# Patient Record
Sex: MALE | Race: BLACK OR AFRICAN AMERICAN | NOT HISPANIC OR LATINO | Employment: OTHER | URBAN - METROPOLITAN AREA
[De-identification: names, ages, dates, MRNs, and addresses within clinical notes are randomized per-mention and may not be internally consistent; named-entity substitution may affect disease eponyms.]

---

## 2022-12-05 ENCOUNTER — HOSPITAL ENCOUNTER (INPATIENT)
Facility: CLINIC | Age: 57
LOS: 9 days | Discharge: NURSING, REHAB OR LONG-TERM CARE | DRG: 884 | End: 2022-12-14
Attending: EMERGENCY MEDICINE | Admitting: HOSPITALIST
Payer: MEDICARE

## 2022-12-05 DIAGNOSIS — G93.40 ENCEPHALOPATHY: ICD-10-CM

## 2022-12-05 DIAGNOSIS — M62.82 NON-TRAUMATIC RHABDOMYOLYSIS: ICD-10-CM

## 2022-12-05 DIAGNOSIS — I10 BENIGN ESSENTIAL HYPERTENSION: Primary | ICD-10-CM

## 2022-12-05 DIAGNOSIS — T69.9XXA COLD EXPOSURE, INITIAL ENCOUNTER: ICD-10-CM

## 2022-12-05 LAB
ALBUMIN SERPL-MCNC: 3.9 G/DL (ref 3.4–5)
ALP SERPL-CCNC: 51 U/L (ref 40–150)
ALT SERPL W P-5'-P-CCNC: 28 U/L (ref 0–70)
ANION GAP SERPL CALCULATED.3IONS-SCNC: 11 MMOL/L (ref 3–14)
AST SERPL W P-5'-P-CCNC: 39 U/L (ref 0–45)
BASOPHILS # BLD AUTO: 0 10E3/UL (ref 0–0.2)
BASOPHILS NFR BLD AUTO: 0 %
BILIRUB SERPL-MCNC: 1.4 MG/DL (ref 0.2–1.3)
BUN SERPL-MCNC: 24 MG/DL (ref 7–30)
CALCIUM SERPL-MCNC: 9.6 MG/DL (ref 8.5–10.1)
CHLORIDE BLD-SCNC: 107 MMOL/L (ref 94–109)
CK SERPL-CCNC: 1215 U/L (ref 30–300)
CO2 SERPL-SCNC: 25 MMOL/L (ref 20–32)
CREAT SERPL-MCNC: 1.04 MG/DL (ref 0.66–1.25)
EOSINOPHIL # BLD AUTO: 0 10E3/UL (ref 0–0.7)
EOSINOPHIL NFR BLD AUTO: 0 %
ERYTHROCYTE [DISTWIDTH] IN BLOOD BY AUTOMATED COUNT: 12.4 % (ref 10–15)
GFR SERPL CREATININE-BSD FRML MDRD: 83 ML/MIN/1.73M2
GLUCOSE BLD-MCNC: 74 MG/DL (ref 70–99)
GLUCOSE BLDC GLUCOMTR-MCNC: 64 MG/DL (ref 70–99)
HCT VFR BLD AUTO: 43.4 % (ref 40–53)
HGB BLD-MCNC: 14.5 G/DL (ref 13.3–17.7)
IMM GRANULOCYTES # BLD: 0 10E3/UL
IMM GRANULOCYTES NFR BLD: 0 %
LYMPHOCYTES # BLD AUTO: 0.6 10E3/UL (ref 0.8–5.3)
LYMPHOCYTES NFR BLD AUTO: 5 %
MCH RBC QN AUTO: 29.1 PG (ref 26.5–33)
MCHC RBC AUTO-ENTMCNC: 33.4 G/DL (ref 31.5–36.5)
MCV RBC AUTO: 87 FL (ref 78–100)
MONOCYTES # BLD AUTO: 0.9 10E3/UL (ref 0–1.3)
MONOCYTES NFR BLD AUTO: 8 %
NEUTROPHILS # BLD AUTO: 10.4 10E3/UL (ref 1.6–8.3)
NEUTROPHILS NFR BLD AUTO: 87 %
NRBC # BLD AUTO: 0 10E3/UL
NRBC BLD AUTO-RTO: 0 /100
PLATELET # BLD AUTO: 166 10E3/UL (ref 150–450)
POTASSIUM BLD-SCNC: 4 MMOL/L (ref 3.4–5.3)
PROT SERPL-MCNC: 8.3 G/DL (ref 6.8–8.8)
RBC # BLD AUTO: 4.99 10E6/UL (ref 4.4–5.9)
SARS-COV-2 RNA RESP QL NAA+PROBE: NEGATIVE
SODIUM SERPL-SCNC: 143 MMOL/L (ref 133–144)
WBC # BLD AUTO: 11.9 10E3/UL (ref 4–11)

## 2022-12-05 PROCEDURE — 80053 COMPREHEN METABOLIC PANEL: CPT | Performed by: EMERGENCY MEDICINE

## 2022-12-05 PROCEDURE — 99292 CRITICAL CARE ADDL 30 MIN: CPT

## 2022-12-05 PROCEDURE — 258N000003 HC RX IP 258 OP 636: Performed by: EMERGENCY MEDICINE

## 2022-12-05 PROCEDURE — 36415 COLL VENOUS BLD VENIPUNCTURE: CPT | Performed by: EMERGENCY MEDICINE

## 2022-12-05 PROCEDURE — 250N000011 HC RX IP 250 OP 636: Performed by: EMERGENCY MEDICINE

## 2022-12-05 PROCEDURE — 120N000001 HC R&B MED SURG/OB

## 2022-12-05 PROCEDURE — 250N000011 HC RX IP 250 OP 636: Performed by: HOSPITALIST

## 2022-12-05 PROCEDURE — 96374 THER/PROPH/DIAG INJ IV PUSH: CPT

## 2022-12-05 PROCEDURE — 258N000003 HC RX IP 258 OP 636: Performed by: HOSPITALIST

## 2022-12-05 PROCEDURE — 96361 HYDRATE IV INFUSION ADD-ON: CPT

## 2022-12-05 PROCEDURE — C9803 HOPD COVID-19 SPEC COLLECT: HCPCS

## 2022-12-05 PROCEDURE — 99291 CRITICAL CARE FIRST HOUR: CPT | Mod: 25

## 2022-12-05 PROCEDURE — 85004 AUTOMATED DIFF WBC COUNT: CPT | Performed by: EMERGENCY MEDICINE

## 2022-12-05 PROCEDURE — 99223 1ST HOSP IP/OBS HIGH 75: CPT | Mod: AI | Performed by: HOSPITALIST

## 2022-12-05 PROCEDURE — U0005 INFEC AGEN DETEC AMPLI PROBE: HCPCS | Performed by: EMERGENCY MEDICINE

## 2022-12-05 PROCEDURE — 82550 ASSAY OF CK (CPK): CPT | Performed by: EMERGENCY MEDICINE

## 2022-12-05 RX ORDER — LIDOCAINE 40 MG/G
CREAM TOPICAL
Status: DISCONTINUED | OUTPATIENT
Start: 2022-12-05 | End: 2022-12-14 | Stop reason: HOSPADM

## 2022-12-05 RX ORDER — LORAZEPAM 2 MG/ML
0.5 INJECTION INTRAMUSCULAR EVERY 6 HOURS PRN
Status: DISCONTINUED | OUTPATIENT
Start: 2022-12-05 | End: 2022-12-14 | Stop reason: HOSPADM

## 2022-12-05 RX ORDER — LORAZEPAM 2 MG/ML
0.5 INJECTION INTRAMUSCULAR EVERY 10 MIN PRN
Status: DISCONTINUED | OUTPATIENT
Start: 2022-12-05 | End: 2022-12-05

## 2022-12-05 RX ORDER — HALOPERIDOL 5 MG/1
5 TABLET ORAL 2 TIMES DAILY
COMMUNITY
Start: 2022-11-23

## 2022-12-05 RX ORDER — LANOLIN ALCOHOL/MO/W.PET/CERES
3 CREAM (GRAM) TOPICAL AT BEDTIME
COMMUNITY
Start: 2022-11-23

## 2022-12-05 RX ORDER — SODIUM CHLORIDE 9 MG/ML
INJECTION, SOLUTION INTRAVENOUS CONTINUOUS
Status: DISCONTINUED | OUTPATIENT
Start: 2022-12-05 | End: 2022-12-06 | Stop reason: ALTCHOICE

## 2022-12-05 RX ORDER — POLYETHYLENE GLYCOL 3350 17 G/17G
17 POWDER, FOR SOLUTION ORAL DAILY
COMMUNITY
Start: 2022-11-23

## 2022-12-05 RX ORDER — HALOPERIDOL 2 MG/1
2 TABLET ORAL EVERY 6 HOURS PRN
COMMUNITY
Start: 2022-11-23

## 2022-12-05 RX ORDER — NICOTINE POLACRILEX 4 MG
15-30 LOZENGE BUCCAL
Status: DISCONTINUED | OUTPATIENT
Start: 2022-12-05 | End: 2022-12-14 | Stop reason: HOSPADM

## 2022-12-05 RX ORDER — DEXTROSE MONOHYDRATE 25 G/50ML
25-50 INJECTION, SOLUTION INTRAVENOUS
Status: DISCONTINUED | OUTPATIENT
Start: 2022-12-05 | End: 2022-12-14 | Stop reason: HOSPADM

## 2022-12-05 RX ADMIN — SODIUM CHLORIDE 1000 ML: 9 INJECTION, SOLUTION INTRAVENOUS at 10:16

## 2022-12-05 RX ADMIN — LORAZEPAM 0.5 MG: 2 INJECTION INTRAMUSCULAR; INTRAVENOUS at 12:11

## 2022-12-05 RX ADMIN — LORAZEPAM 0.5 MG: 2 INJECTION INTRAMUSCULAR; INTRAVENOUS at 20:19

## 2022-12-05 RX ADMIN — SODIUM CHLORIDE: 9 INJECTION, SOLUTION INTRAVENOUS at 20:20

## 2022-12-05 RX ADMIN — SODIUM CHLORIDE 1000 ML: 9 INJECTION, SOLUTION INTRAVENOUS at 11:32

## 2022-12-05 ASSESSMENT — ACTIVITIES OF DAILY LIVING (ADL)
ADLS_ACUITY_SCORE: 35

## 2022-12-05 NOTE — ED TRIAGE NOTES
Pt comes via EMS. Pt is from group home, last seen at 4:20 pm yesterday (12/4). Pt wondered off and this AM EMS found him miles away from group home [place]. Pt is cold, confused to situation, somnolent at times, at other times anxious, rambling saying strange and often paranoid comments. Blood sugar with EMS 95. All other vitals stable. Temp on admission 97.4f oral.      Triage Assessment     Row Name 12/05/22 1007       Triage Assessment (Adult)    Airway WDL WDL       Respiratory WDL    Respiratory WDL WDL       Skin Circulation/Temperature WDL    Skin Circulation/Temperature WDL X;temperature    Skin Temperature cool       Cardiac WDL    Cardiac WDL WDL       Peripheral/Neurovascular WDL    Peripheral Neurovascular WDL neurovascular assessment upper;neurovascular assessment lower;pulse assessment    Pulse Assessment dorsalis pedis       LUE Neurovascular Assessment    Temperature LUE cold       RUE Neurovascular Assessment    Temperature RUE cold       LLE Neurovascular Assessment    Temperature LLE cold       RLE Neurovascular Assessment    Temperature RLE cold       Pulse Dorsalis Pedis    Left Dorsalis Pedis Pulse 2+ (normal)    Right Dorsalis Pedis Pulse 2+ (normal)       Cognitive/Neuro/Behavioral WDL    Cognitive/Neuro/Behavioral WDL X    Level of Consciousness somnolent    Arousal Level arouses to touch/gentle shaking    Orientation disoriented to;place;situation    Mood/Behavior anxious       Vaughn Coma Scale    Best Eye Response 3-->(E3) to speech    Best Motor Response 4-->(M4) withdraws from pain    Best Verbal Response 4-->(V4) confused    Vaughn Coma Scale Score 11

## 2022-12-05 NOTE — ED NOTES
[facility]  ED Nurse Handoff Report    ED Chief complaint: Cold Exposure and Altered Mental Status      ED Diagnosis:   Final diagnoses:   Non-traumatic rhabdomyolysis   Cold exposure, initial encounter   Encephalopathy       Code Status: not addressed at this time    Allergies: No Known Allergies    Patient Story: Pt comes via EMS. Pt is from group home, last seen at 4:20 pm yesterday (12/4). Pt wondered off and this AM EMS found him miles away from group home in a [place] area. Pt is cold, confused to situation, somnolent at times, at other times anxious, rambling saying strange and often paranoid comments. Blood sugar with EMS 95. All other vitals stable. Temp on admission 97.4f oral.  Focused Assessment:  Pt remains confused and sleepy at times. VSS on RA. Around 1200 pt was standing in room peeing, striking out at staff when trying to assist back to bed, see previous note. Pt was placed in 5 point restraints, Iv ativan also given. Pt confused and saying paranoid comments. Long psych history. 2L NS given. CK elevated, see results below.     Abnormal Labs Resulted from Time of ED Arrival to Time of ED Departure   COMPREHENSIVE METABOLIC PANEL - Abnormal       Result Value    Sodium 143      Potassium 4.0      Chloride 107      Carbon Dioxide (CO2) 25      Anion Gap 11      Urea Nitrogen 24      Creatinine 1.04      Calcium 9.6      Glucose 74      Alkaline Phosphatase 51      AST 39      ALT 28      Protein Total 8.3      Albumin 3.9      Bilirubin Total 1.4 (*)     GFR Estimate 83     CK TOTAL - Abnormal    CK 1,215 (*)    CBC WITH PLATELETS AND DIFFERENTIAL - Abnormal    WBC Count 11.9 (*)     RBC Count 4.99      Hemoglobin 14.5      Hematocrit 43.4      MCV 87      MCH 29.1      MCHC 33.4      RDW 12.4      Platelet Count 166      % Neutrophils 87      % Lymphocytes 5      % Monocytes 8      % Eosinophils 0      % Basophils 0      % Immature Granulocytes 0      NRBCs per 100 WBC 0      Absolute  "Neutrophils 10.4 (*)     Absolute Lymphocytes 0.6 (*)     Absolute Monocytes 0.9      Absolute Eosinophils 0.0      Absolute Basophils 0.0      Absolute Immature Granulocytes 0.0      Absolute NRBCs 0.0         To be done/followed up on inpatient unit:  cont with admitting MD orders    Does this patient have any cognitive concerns?: Baseline dementia, Disoriented to time, Disoriented to place and Disoriented to situation    Activity level - Baseline/Home:  Independent  Activity Level - Current:   Stand with Assist    Patient's Preferred language: English   Needed?: No    Isolation: None  Infection: Not Applicable  Patient tested for COVID 19 prior to admission: YES  Bariatric?: No    Vital Signs:   Vitals:    12/05/22 1009 12/05/22 1015 12/05/22 1300   BP: (!) 143/87 105/85    Pulse: 99 99    Resp: 14     Temp: 97.5  F (36.4  C)     TempSrc: Oral     SpO2: 95% 95%    Weight:   86.3 kg (190 lb 4.1 oz)   Height:   1.905 m (6' 3\")       Cardiac Rhythm:     Was the PSS-3 completed:   Yes  What interventions are required if any?               Family Comments: group home attempted to call, VM left  OBS brochure/video discussed/provided to patient/family: No                For the majority of the shift this patient's behavior was yellow/red.   Behavioral interventions performed were Security presence, restraints, continuous video monitoring     ED NURSE PHONE NUMBER: [phone]       "

## 2022-12-05 NOTE — H&P
"[facility]    History and Physical  Hospitalist       Date of Admission:  12/5/2022    Assessment & Plan   Evans Zimmer is a 57 year old male resident of halfway, presented with altered mental status, cold exposure per EMS.    History major neurocognitive disorder, no history elicited from patient  Behavioral issue, wandering from Group Home  Cold exposure x 24 hours; mild rhabdo; admission CK 1215. .  No history from patient, primarily nonverbal, per EMS lives in group home, last seen yesterday afternoon then wandered off from group home.  Found in [facility] by EMS, cold exposure overnight.  At this time no history from group home.  Review of Care Everywhere indicates last hospitalization [facility] in August 2022 [8/-/11/25/22] essentially presenting the same, nonsensical, loose association, seen by Psychiatry with diagnosis of \"major neurocognitive disorder\" notation of antisocial behavior, history of opiate use disorder, previously on methadone maintenance, admission UDS negative; current medication awaiting input from group [place].   On that hospitalization there was notation of possible Padilla/commitment however does not appear this was done and patient discharged to group home.  In ED patient reported he was cold, no signs of cold exposure, core temperature now normal after warming blankets however CK elevated at 1215; normal CR  .In ED reports of acute aggressive behavior.  In discussing with nurse it appears that he needed to urinate and could not be consoled, became physically aggressive.  ED initiated four-point physical restraints and IV Ativan.  -Sitter 1: 1, monitor behaviors.  Continue hold status per ED  -Psychiatry consult  -At this time mentation appears at baseline and patient could not comply to head CT without significant sedation; monitor  -IVF, BMP and CK in AM  -PRN IV Ativan for acute aggressive behavior.  Need to assess patient's ability to comply to " "p.o.PRN medications, i.e. Zyprexa or Seroquel.  Need to obtain from Group Home PTA maintenance medications.  Defer to Psychiatry if need for any scheduled antipsychotic medications.  As acute agitation ED was secondary to need to urinate, scheduled toileting as it appears patient cannot communicate wants/needs.  -Attempt reassurance/redirection.  -SW to assist with disposition pending psychiatry evaluation.    History hepatitis C, historical  -Noted    -History hearing impairment, unable to assess, however may be contributory to behavioral issues.    DVT Prophylaxis: Ambulate every shift  Code Status: Full Code; unable to discuss with patient, need determination from Group Home, in the interim maintain full CODE STATUS.    Sreekanth Foote MD    Primary Care Physician   Physician No Ref-Primary    Chief Complaint   Per EMS found wandering, cold exposure.    History is obtained from EMS and ED provider    History of Present Illness   Evans Zimmer is a 57 year old male resident of [facility], presented with altered mental status, cold exposure per EMS.   No history from patient, primarily nonverbal, per EMS lives in group home, last seen yesterday afternoon then wandered off from group home.  Found in [facility] by EMS, cold exposure overnight.  At this time no history from group home.  Review of Care Everywhere indicates last hospitalization [facility] in August 2022 [8/-/11/25/22] essentially presenting the same, nonsensical, loose association, seen by Psychiatry with diagnosis of \"major neurocognitive disorder\" notation of antisocial behavior, history of opiate use disorder, previously on methadone maintenance, admission UDS negative; current medication awaiting input from group [place].   On that hospitalization there was notation of possible Padilla/commitment however does not appear this was done and patient discharged to group home.  In ED patient reported he was cold, no signs of cold exposure, core " temperature now normal after warming blankets however CK elevated at 1215; normal CR  .In ED reports of acute aggressive behavior.  In discussing with nurse it appears that he needed to urinate and could not be consoled, became physically aggressive.  ED initiated four-point physical restraints and IV Ativan. Remainder of HPI as above.        Past Medical History    I have reviewed this patient's medical history and updated it with pertinent information if needed.   Major neurocognitive disorder  History of opioid use disorder previously on methadone maintenance    Hepatitis C.    Past Surgical History   I have reviewed this patient's surgical history and updated it with pertinent information if needed.  Inguinal hernia repair    Allergies   No Known Allergies    Social History   I have reviewed this patient's social history and updated it with pertinent information if needed  Lives in Group Home    Family History   I have reviewed this patient's family history and updated it with pertinent information if needed.   Hx not available from patient; review of records indicates no pertinent FH    Review of Systems   The 10 point Review of Systems is negative other than noted in the HP    Physical Exam   Temp: 97.5  F (36.4  C) Temp src: Oral BP: 105/85 Pulse: 99   Resp: 14 SpO2: 95 % O2 Device: None (Room air)    Vital Signs with Ranges  Temp:  [97.5  F (36.4  C)] 97.5  F (36.4  C)  Pulse:  [99] 99  Resp:  [14] 14  BP: (105-143)/(85-87) 105/85  SpO2:  [95 %] 95 %  0 lbs 0 oz    General/Constitutional:  No acute distress, nonverbal, in four-point restraints, calm, cooperative, appears comfortable.  HEENT/Head Exam:  atraumatic  Eyes:  PERRL, no conjunctivits  Mouth/Oral Pharynx:  Buccal mucosa WNL  Chest/Respiratory: Respiration nonlabored on RA.  Cardiovascular:  no murmur appreciated.  LE edema none  Gastrointestinal/Abdomen:  soft, nontender,no rebound, guarding or other peritoneal signs.  Musculoskeletal:  extremities  warm, dry, noncyanotic  Neurologic:  gross CN and motor testing nonfocal.    Psychiatric:  Mental status: Awake; orientation could be tested. affect calm       Data     Recent Labs   Lab 12/05/22  1015   WBC 11.9*   HGB 14.5   MCV 87         POTASSIUM 4.0   CHLORIDE 107   CO2 25   BUN 24   CR 1.04   ANIONGAP 11   ALCIRA 9.6   GLC 74   ALBUMIN 3.9   PROTTOTAL 8.3   BILITOTAL 1.4*   ALKPHOS 51   ALT 28   AST 39

## 2022-12-05 NOTE — PHARMACY-ADMISSION MEDICATION HISTORY
Pharmacy Medication History  Admission medication history interview status for the 12/5/2022  admission is complete. See EPIC admission navigator for prior to admission medications     Location of Interview: Outside patient room but on unit  Medication history sources: Surescripts, [place] and Care Everywhere    Significant changes made to the medication list:  Added all medications from recent [facility] stay      Time spent in this activity: 45 minutes    Prior to Admission medications    Medication Sig Last Dose Taking? Auth Provider Long Term End Date   cholecalciferol 25 MCG (1000 UT) TABS Take 2,000 Units by mouth daily Unknown Yes Unknown, Entered By History     haloperidol (HALDOL) 2 MG tablet Take 2 mg by mouth every 6 hours as needed for agitation Unknown Yes Unknown, Entered By History Yes    haloperidol (HALDOL) 5 MG tablet Take 5 mg by mouth 2 times daily Unknown Yes Unknown, Entered By History Yes    melatonin 3 MG tablet Take 3 mg by mouth At Bedtime Unknown Yes Unknown, Entered By History     polyethylene glycol (MIRALAX) 17 GM/Dose powder Take 17 g by mouth daily Unknown Yes Unknown, Entered By History         The information provided in this note is only as accurate as the sources available at the time of update(s)
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
## 2022-12-06 LAB
ANION GAP SERPL CALCULATED.3IONS-SCNC: 2 MMOL/L (ref 3–14)
BUN SERPL-MCNC: 18 MG/DL (ref 7–30)
CALCIUM SERPL-MCNC: 8.4 MG/DL (ref 8.5–10.1)
CHLORIDE BLD-SCNC: 114 MMOL/L (ref 94–109)
CK SERPL-CCNC: 806 U/L (ref 30–300)
CO2 SERPL-SCNC: 25 MMOL/L (ref 20–32)
CREAT SERPL-MCNC: 0.81 MG/DL (ref 0.66–1.25)
GFR SERPL CREATININE-BSD FRML MDRD: >90 ML/MIN/1.73M2
GLUCOSE BLD-MCNC: 76 MG/DL (ref 70–99)
GLUCOSE BLDC GLUCOMTR-MCNC: 123 MG/DL (ref 70–99)
GLUCOSE BLDC GLUCOMTR-MCNC: 67 MG/DL (ref 70–99)
GLUCOSE BLDC GLUCOMTR-MCNC: 76 MG/DL (ref 70–99)
POTASSIUM BLD-SCNC: 4 MMOL/L (ref 3.4–5.3)
SODIUM SERPL-SCNC: 141 MMOL/L (ref 133–144)

## 2022-12-06 PROCEDURE — 258N000003 HC RX IP 258 OP 636: Performed by: HOSPITALIST

## 2022-12-06 PROCEDURE — 250N000011 HC RX IP 250 OP 636: Performed by: HOSPITALIST

## 2022-12-06 PROCEDURE — 36415 COLL VENOUS BLD VENIPUNCTURE: CPT | Performed by: HOSPITALIST

## 2022-12-06 PROCEDURE — 99233 SBSQ HOSP IP/OBS HIGH 50: CPT | Performed by: HOSPITALIST

## 2022-12-06 PROCEDURE — 80048 BASIC METABOLIC PNL TOTAL CA: CPT | Performed by: HOSPITALIST

## 2022-12-06 PROCEDURE — 82550 ASSAY OF CK (CPK): CPT | Performed by: HOSPITALIST

## 2022-12-06 PROCEDURE — 120N000001 HC R&B MED SURG/OB

## 2022-12-06 PROCEDURE — 258N000001 HC RX 258: Performed by: INTERNAL MEDICINE

## 2022-12-06 RX ADMIN — LORAZEPAM 0.5 MG: 2 INJECTION INTRAMUSCULAR; INTRAVENOUS at 03:17

## 2022-12-06 RX ADMIN — DEXTROSE MONOHYDRATE 50 ML: 25 INJECTION, SOLUTION INTRAVENOUS at 08:16

## 2022-12-06 RX ADMIN — DEXTROSE AND SODIUM CHLORIDE: 5; 900 INJECTION, SOLUTION INTRAVENOUS at 08:35

## 2022-12-06 RX ADMIN — DEXTROSE MONOHYDRATE 25 ML: 25 INJECTION, SOLUTION INTRAVENOUS at 00:02

## 2022-12-06 ASSESSMENT — ACTIVITIES OF DAILY LIVING (ADL)
ADLS_ACUITY_SCORE: 35

## 2022-12-06 NOTE — ED NOTES
Patient remain asleep after removal of his restraints.   IVF infusing well.   Arousable with voice and gentle touch. Did not follow commands, did not respond to questions. Unable to complete Communicable Disease Screening and PSS-3 scale.   Maintained 1:1 sitter.
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
## 2022-12-06 NOTE — ED NOTES
Right ankle, Right wrist, Left ankle, Left wrist, and Lap belt restraints continued 12/5/2022    Called DR Holguin to request for restraint renewal. Will continue attempts to wean from restraints.     CK level 1215. Called Dr Holguin for update. Plan to draw labs in AM.         Attending Physician Notified: Yes, Attending Physician's Name: Dr Holguin   New orders placed Yes         Marizol Swartz RN

## 2022-12-06 NOTE — ED NOTES
[facility] Note:    Writer assisted in getting a laptop set up for patient's recommitment hearing that was scheduled for this morning at 10:30. ED 1:1 was present during recommitment hearing and writer does not know further details. It was explained that at this time, the extended care team is NOT following patient and once he is admitted medically, consult and liason can be consulted for ongoing needs. It is necessary to note that the following are needed for ongoing coordination and commitment/revocation needs:    -: Vika Ramirez, [phone], [email or web address]    - [facility] Attorney: Petros Joseph, [phone], [email or web address].      Alma Moeller on 12/6/2022 at 12:01 PM

## 2022-12-06 NOTE — ED NOTES
Patient was out of bed, wandering around the room. Urinated and had a watery BM. Pt removed his IV and the PrimoWick. Difficult to re-direct, took 3 nurses and an ED tech to help him to his bed and get him cleaned. 24g IV restarted an IV ativan given.

## 2022-12-06 NOTE — ED NOTES
Patient ambulated to the bathroom with assist of 2. Patient got to the bathroom and states he wants to go back to his bed. Patient walked back to his room.

## 2022-12-06 NOTE — PROGRESS NOTES
RECEIVING UNIT ED HANDOFF REVIEW    ED Nurse Handoff Report was reviewed by: Aram Yates RN on December 6, 2022 at 4:56 PM

## 2022-12-07 LAB
ANION GAP SERPL CALCULATED.3IONS-SCNC: 5 MMOL/L (ref 3–14)
BUN SERPL-MCNC: 13 MG/DL (ref 7–30)
CALCIUM SERPL-MCNC: 8.2 MG/DL (ref 8.5–10.1)
CHLORIDE BLD-SCNC: 113 MMOL/L (ref 94–109)
CK SERPL-CCNC: 1057 U/L (ref 30–300)
CO2 SERPL-SCNC: 23 MMOL/L (ref 20–32)
CREAT SERPL-MCNC: 0.81 MG/DL (ref 0.66–1.25)
ERYTHROCYTE [DISTWIDTH] IN BLOOD BY AUTOMATED COUNT: 12.3 % (ref 10–15)
GFR SERPL CREATININE-BSD FRML MDRD: >90 ML/MIN/1.73M2
GLUCOSE BLD-MCNC: 92 MG/DL (ref 70–99)
HCT VFR BLD AUTO: 42.3 % (ref 40–53)
HGB BLD-MCNC: 13.8 G/DL (ref 13.3–17.7)
MCH RBC QN AUTO: 29.1 PG (ref 26.5–33)
MCHC RBC AUTO-ENTMCNC: 32.6 G/DL (ref 31.5–36.5)
MCV RBC AUTO: 89 FL (ref 78–100)
PLATELET # BLD AUTO: 149 10E3/UL (ref 150–450)
POTASSIUM BLD-SCNC: 3.5 MMOL/L (ref 3.4–5.3)
RBC # BLD AUTO: 4.75 10E6/UL (ref 4.4–5.9)
SODIUM SERPL-SCNC: 141 MMOL/L (ref 133–144)
WBC # BLD AUTO: 5.1 10E3/UL (ref 4–11)

## 2022-12-07 PROCEDURE — 999N000040 HC STATISTIC CONSULT NO CHARGE VASC ACCESS

## 2022-12-07 PROCEDURE — 85014 HEMATOCRIT: CPT | Performed by: HOSPITALIST

## 2022-12-07 PROCEDURE — 250N000013 HC RX MED GY IP 250 OP 250 PS 637: Performed by: HOSPITALIST

## 2022-12-07 PROCEDURE — 82550 ASSAY OF CK (CPK): CPT | Performed by: HOSPITALIST

## 2022-12-07 PROCEDURE — 36415 COLL VENOUS BLD VENIPUNCTURE: CPT | Performed by: HOSPITALIST

## 2022-12-07 PROCEDURE — 80048 BASIC METABOLIC PNL TOTAL CA: CPT | Performed by: HOSPITALIST

## 2022-12-07 PROCEDURE — 250N000011 HC RX IP 250 OP 636: Performed by: HOSPITALIST

## 2022-12-07 PROCEDURE — 999N000128 HC STATISTIC PERIPHERAL IV START W/O US GUIDANCE

## 2022-12-07 PROCEDURE — 999N000127 HC STATISTIC PERIPHERAL IV START W US GUIDANCE

## 2022-12-07 PROCEDURE — 120N000001 HC R&B MED SURG/OB

## 2022-12-07 PROCEDURE — 99233 SBSQ HOSP IP/OBS HIGH 50: CPT | Performed by: HOSPITALIST

## 2022-12-07 RX ORDER — SODIUM CHLORIDE AND POTASSIUM CHLORIDE 150; 900 MG/100ML; MG/100ML
INJECTION, SOLUTION INTRAVENOUS CONTINUOUS
Status: DISCONTINUED | OUTPATIENT
Start: 2022-12-07 | End: 2022-12-08

## 2022-12-07 RX ORDER — HALOPERIDOL 5 MG/1
5 TABLET ORAL 2 TIMES DAILY
Status: DISCONTINUED | OUTPATIENT
Start: 2022-12-07 | End: 2022-12-14 | Stop reason: HOSPADM

## 2022-12-07 RX ORDER — SODIUM CHLORIDE AND POTASSIUM CHLORIDE 150; 900 MG/100ML; MG/100ML
INJECTION, SOLUTION INTRAVENOUS CONTINUOUS
Status: DISCONTINUED | OUTPATIENT
Start: 2022-12-07 | End: 2022-12-07

## 2022-12-07 RX ORDER — OLANZAPINE 5 MG/1
5 TABLET, ORALLY DISINTEGRATING ORAL EVERY 6 HOURS PRN
Status: DISCONTINUED | OUTPATIENT
Start: 2022-12-07 | End: 2022-12-14 | Stop reason: HOSPADM

## 2022-12-07 RX ORDER — POLYETHYLENE GLYCOL 3350 17 G/17G
17 POWDER, FOR SOLUTION ORAL DAILY
Status: DISCONTINUED | OUTPATIENT
Start: 2022-12-07 | End: 2022-12-07 | Stop reason: DRUGHIGH

## 2022-12-07 RX ORDER — POLYETHYLENE GLYCOL 3350 17 G/17G
17 POWDER, FOR SOLUTION ORAL DAILY
Status: DISCONTINUED | OUTPATIENT
Start: 2022-12-07 | End: 2022-12-14 | Stop reason: HOSPADM

## 2022-12-07 RX ORDER — VITAMIN B COMPLEX
2000 TABLET ORAL DAILY
Status: DISCONTINUED | OUTPATIENT
Start: 2022-12-07 | End: 2022-12-14 | Stop reason: HOSPADM

## 2022-12-07 RX ADMIN — HALOPERIDOL 5 MG: 5 TABLET ORAL at 20:58

## 2022-12-07 RX ADMIN — POTASSIUM CHLORIDE AND SODIUM CHLORIDE: 900; 150 INJECTION, SOLUTION INTRAVENOUS at 11:16

## 2022-12-07 RX ADMIN — Medication 2000 UNITS: at 11:14

## 2022-12-07 RX ADMIN — HALOPERIDOL 5 MG: 5 TABLET ORAL at 11:18

## 2022-12-07 RX ADMIN — POLYETHYLENE GLYCOL 3350 17 G: 17 POWDER, FOR SOLUTION ORAL at 11:21

## 2022-12-07 ASSESSMENT — ACTIVITIES OF DAILY LIVING (ADL)
ADLS_ACUITY_SCORE: 41
ADLS_ACUITY_SCORE: 35
ADLS_ACUITY_SCORE: 41
ADLS_ACUITY_SCORE: 35
ADLS_ACUITY_SCORE: 41
ADLS_ACUITY_SCORE: 41

## 2022-12-07 NOTE — PROGRESS NOTES
"[facility]    Hospitalist Progress Note      Assessment & Plan   Evans Zimmer is a 57 year old male resident of assisted, presented with altered mental status, cold exposure per EMS.    History major neurocognitive disorder, no history elicited from patient  Behavioral issue, wandering from Group Home  Cold exposure x 24 hours; mild rhabdo; admission CK 1215. .  No history from patient, primarily nonverbal, per EMS lives in group home, last seen yesterday day before admission then wandered off from group home.  Found in [facility] by EMS, cold exposure overnight.  At this time no history from group home.  Review of Care Everywhere indicates last hospitalization [facility] in August 2022 [8/-/11/25/22] essentially presenting the same, nonsensical, loose association, seen by Psychiatry with diagnosis of \"major neurocognitive disorder\" notation of antisocial behavior, history of opiate use disorder, previously on methadone maintenance, admission UDS negative; current medication awaiting input from group home.   On that hospitalization there was notation of possible [place]/commitment however does not appear this was done and patient discharged to group home.  In ED patient reported he was cold, no signs of cold exposure, core temperature normal after warming blankets however CK elevated at 1215; normal CR  .In ED reports of acute aggressive behavior.  In discussing with nurse it appears that he needed to urinate and could not be consoled, became physically aggressive.  ED initiated four-point physical restraints and IV Ativan.  -Sitter 1: 1, monitor behaviors.  Continue hold status per ED  -Psychiatry consult  -At this time mentation appears at baseline and patient could not comply to head CT without significant sedation; monitor  -IVF, BMP and CK in AM; WNL. No able to eat; discontinue IVF  -PRN IV Ativan for acute aggressive behavior.  Need to assess patient's ability to comply to " "p.o.PRN medications, i.e. Zyprexa or Seroquel.  Need to obtain from Group Home PTA maintenance medications.  Defer to Psychiatry if need for any scheduled antipsychotic medications.  As acute agitation ED was secondary to need to urinate, scheduled toileting as it appears patient cannot communicate wants/needs.  Ambulation/walks around unit with gait belt and staff as patient desires and staff available to prevent agitation.  -Attempt reassurance/redirection.  -12/7/2022 mental health notes states \"commitment has been renewed and awaiting court revocation order of his provisional discharge to be signed by a  that would change his 72-hour hold status to court ordered status.'  -Received PTA maintenance medications from group home that includes Haldol 5 mg twice daily, we will initiate and monitor.  Continued scheduled toileting as patient cannot states wants and needs as well as ambulation with staff and gait belt as staff is available to prevent agitation   -RRT called earlier this morning as he attempted to leave his room on his own.  Again his behavior is interpreted as aggressive however he cannot express his wants and needs.  Scheduled toileting and ambulate with Sitter as available as patient cannot express his wants/needs to decrease episodes of agitation..  -CK increased to 1057, IVF continued yesterday however appears that patient does not take adequate p.o.'s, restart IVF NS with 20 mEq KCl at 100 cc an hour, recheck CK and BMP in AM.  Encourage fluids.      History hepatitis C, historical  -Noted     -History hearing impairment, unable to assess, Patient unable to respond to questioning;  however may be contributory to behavioral issues.    DVT Prophylaxis: Ambulate every shift  Code Status: Full Code    Expected discharge:  >2 days pending clinical improvement rhabdomyolysis, adequate p.o. intake without IV support, safe disposition, status of commitment hearings.    Sreekanth Foote MD  Text Page " (7am - 6pm, M-F)    Total unit/floor time 35 minutes:  time consisted of the following, examination of patient, review of records including labs, imaging results, medications, interdisciplinary notes and completing documentation; > 50%  Coordination of Care time with Nursing, Sitter and SW re: behaviors, rhabdomyolysis, fluid intake care plan, management and surveillance.      Interval History   Limited history from patient however appears more awake, more easily answers with 1 or 2 words appropriately.  There is negative to pain.  Somewhat impulsive and that he gets up and wants to walk.  RRT called earlier this morning as he attempted to leave his room on his own.  Again his behavior is interpreted as aggressive however he cannot express his wants and needs.      SH: No tobacco. Lives in     ROS: Complete ROS negative except as above.     -Data reviewed today: I reviewed all new labs and imaging results over the last 24 hours.  Physical Exam   Temp: 98  F (36.7  C) Temp src: Oral BP: (!) 141/84 Pulse: 70   Resp: 17 SpO2: 96 % O2 Device: None (Room air)    Vitals:    12/05/22 1300   Weight: 86.3 kg (190 lb 4.1 oz)     General/Constitutional:  NAD, more awake, calm, more cooperative.  Long arm sitter.         HEENT/Head Exam:  atraumatic  Eyes:  PERRL, no conjunctivits  Mouth/Oral Pharynx:  Buccal mucosa WNL  Chest/Respiratory: Respiration nonlabored on RA.  Cardiovascular:  no murmur appreciated.  LE edema none  Gastrointestinal/Abdomen:  soft, nontender,no rebound, guarding or other peritoneal signs.  Musculoskeletal:  extremities warm, dry, noncyanotic  Neurologic:  gross motor testing nonfocal.    Psychiatric:  Mental status: Awake; orientation could be tested. affect calm     Medications     0.9% sodium chloride + KCl 20 mEq/L 100 mL/hr at 12/07/22 1116       haloperidol  5 mg Oral BID     polyethylene glycol  17 g Oral Daily     sodium chloride (PF)  3 mL Intracatheter Q8H     Vitamin D3  2,000 Units Oral  Daily       Data   Recent Labs   Lab 12/07/22  0852 12/06/22  0851 12/06/22  0812 12/06/22  0721 12/05/22  2330 12/05/22  1015   WBC 5.1  --   --   --   --  11.9*   HGB 13.8  --   --   --   --  14.5   MCV 89  --   --   --   --  87   *  --   --   --   --  166     --   --  141  --  143   POTASSIUM 3.5  --   --  4.0  --  4.0   CHLORIDE 113*  --   --  114*  --  107   CO2 23  --   --  25  --  25   BUN 13  --   --  18  --  24   CR 0.81  --   --  0.81  --  1.04   ANIONGAP 5  --   --  2*  --  11   ALCIRA 8.2*  --   --  8.4*  --  9.6   GLC 92 123* 67* 76   < > 74   ALBUMIN  --   --   --   --   --  3.9   PROTTOTAL  --   --   --   --   --  8.3   BILITOTAL  --   --   --   --   --  1.4*   ALKPHOS  --   --   --   --   --  51   ALT  --   --   --   --   --  28   AST  --   --   --   --   --  39    < > = values in this interval not displayed.

## 2022-12-07 NOTE — CONSULTS
"      Initial Psychiatric Consult   Consult date: December 7, 2022         Reason for Consult, requesting source:    Neurocognitive disorder, wanders  Requesting source: Sreekanth Foote    This note is being entered to supplement the psychiatry consultation note that was completed on December 7, 2022 by the licensed mental health professional Symone Zimmerman. They have reviewed with me the pertinent clinical details related to their encounter. I am being consulted to offer additional guidance on psychiatric pharmacological interventions.         HPI:   Evans Zimmer is a 57 year old male with a history of dementia, opiate use disorder (previously on methadone maintenance), resident of [facility], who presented on 12/5 with altered mental status, cold exposure per EMS who found him in a swampy area after wandering away from group home. Patient himself is primarily nonverbal, or provides nonsensical answers at times, so he is not a reliable historian. He was previously admitted to [facility] in August 2022 at which time he was placed on commitment; per unit SW this was just renewed so is still in effect.     I met with Evans in his room where he is seen sitting in recliner, wearing hospital scrubs. He presents with flat affect. At times does not respond to questions but in generally he does respond, though responses do not always make sense. His speech is very soft and difficult to hear at times. He gestures to his abdomen as if he is complaining of some kind of pain or discomfort, but then denies any problems with his abdomen. He states he is sleeping \"fine\". States he has no problems with eating. He does not have any complaints at this time. He is unable to respond to orientation questions. Abruptly stands up, walks over to the window, then gestures with his finger over his mouth to be quiet while he looks out the window in apparent paranoia though he is unable to verbalize what he is experiencing. He is denies " "problems with his mood, anxiety, denies SI/HI/AVH.    Spoke with nursing, who reports that his behaviors have been manageable and he has been redirectable. Does state that earlier today he used a fork to poke his IV tubing, requiring IV to be removed and have been trying to get a new IV placed. Group home is willing to accept him back once medically stable, plans to use a \"wander guard\" to alert staff if he wanders away again.            Physical ROS:   The 10 point Review of Systems is negative other than noted in the HPI or here.           Medications:       haloperidol  5 mg Oral BID     polyethylene glycol  17 g Oral Daily     sodium chloride (PF)  3 mL Intracatheter Q8H     Vitamin D3  2,000 Units Oral Daily            Physical and Psychiatric Examination:     BP (!) 141/84 (BP Location: Right arm)   Pulse 70   Temp 98  F (36.7  C) (Oral)   Resp 17   Ht 1.905 m (6' 3\")   Wt 86.3 kg (190 lb 4.1 oz)   SpO2 96%   BMI 23.78 kg/m    Weight is 190 lbs 4.11 oz  Body mass index is 23.78 kg/m .    Physical Exam:  I have reviewed the physical exam as documented by by the medical team and agree with findings and assessment and have no additional findings to add at this time.    Mental Status Exam:  Appearance: awake, alert and wearing hospital scrubs  Attitude:  somewhat cooperative  Eye Contact:  poor   Mood:  \"I'm good\"  Affect:  flat  Speech:  very soft  Psychomotor Behavior:  no evidence of tardive dyskinesia, dystonia, or tics  Throught Process:  disorganized, evidence of thought blocking present and illogical  Associations:  loosening of associations present  Thought Content:  no evidence of suicidal ideation or homicidal ideation and appears paranoid but unable to verbalize  Insight:  partial  Judgement:  poor  Oriented to:  unable to respond to questions, seems oriented to self only  Attention Span and Concentration:  limited  Recent and Remote Memory:  limited               DSM-5 Diagnosis:   Dementia " "(major neurocognitive disorder), by history  R/O delirium  History of opiate use disorder, in remission          Assessment:   Evans Zimmer is a 57 year old male with a history of dementia who presented on 12/5 after wandering away from his group home, was found in a swampy area per EMS. PTA medications include haloperidol 5mg BID which has been continued here. Does not appear to be overly sedated from this medication, does still appear to have some paranoia but does not seem overt. Recommend continuing home haloperidol at PTA dosing. Will order olanzapine ODT 5mg PRN for agitation.          Summary of Recommendations:   1.  Continue haloperidol 5mg BID    2.  Ordered olanzapine 5mg every 6 hours as needed for agitation/aggression    3.  Recommend continued follow up with outpatient psych provider following discharge    4.  Please reconsult psychiatry as needed.       \"This dictation was performed with voice recognition software and may contain errors,  omissions and inadvertent word substitution.\"           "

## 2022-12-07 NOTE — PLAN OF CARE
Goal Outcome Evaluation:  Summary: AMS, Wanders, Cold exposure, [place], From Group home. 72hr Hold till 12/7 2235.  DATE & TIME:  12/6/22 3504-4978   Cognitive Concerns/ Orientation : LIDIA, Pt does not answer any questions.   BEHAVIOR & AGGRESSION TOOL COLOR: Green, Flat affect.  CIWA SCORE: NA  ABNL VS/O2: VSS @RA  MOBILITY: Stand by.  PAIN MANAGMENT: LIDIA  DIET: Reg  BOWEL/BLADDER: Continent  ABNL LAB/BG: NA. PRN BG checks.   DRAIN/DEVICES: PIV SL  TELEMETRY RHYTHM: NA  SKIN: LIDIA, Pt refused to take off his clothing.  TESTS/PROCEDURES: NA  D/C DAY/GOALS/PLACE: NA  OTHER IMPORTANT INFO: Pt is 1:1. Ativan for agitation. Pt inconsistent with responding to answer. Withdrawn and flat affect. Unable to complete admission questions. Pt calm and quiet this shift. Needs frequent redirection. PRN BG.

## 2022-12-07 NOTE — PLAN OF CARE
Goal Outcome Evaluation:      Plan of Care Reviewed With: patient    Overall Patient Progress: no change    DATE & TIME:  12/6/22 3180-5374  Cognitive Concerns/ Orientation : LIDIA, pt inconsistent with answering questions. Quiet. Alert. Flat affect  BEHAVIOR & AGGRESSION TOOL COLOR: Green  ABNL VS/O2: VSS on RA   MOBILITY: SBA  PAIN MANAGMENT: Denies, absence of nonverbal pain indicators   DIET: Regular  BOWEL/BLADDER: Continent  ABNL LAB/BG: , WBC 11.9  DRAIN/DEVICES: Pt removed PIV  SKIN: LIDIA, refused  TESTS/PROCEDURES: NA  D/C DAY/GOALS/PLACE: Pending psych eval. Pt is from a group home.  OTHER IMPORTANT INFO: Unable to complete admission questions. 72 hour hold. Psych consulted.

## 2022-12-07 NOTE — PLAN OF CARE
Goal Outcome Evaluation:                    Summary: AMS, Wanders, Cold exposure, [place], From Group home. 72hr Hold till 12/7 2235.  DATE & TIME:  12/7/22 1700  Cognitive Concerns/ Orientation : LIDIA, pt inconsistent with answering questions. Quiet, talks in a whisper. Alert. Flat affect  BEHAVIOR & AGGRESSION TOOL COLOR: Green  ABNL VS/O2: VSS on RA   MOBILITY: SBA  PAIN MANAGMENT: Denies, absence of nonverbal pain indicators   DIET: Regular  BOWEL/BLADDER: Continent  ABNL LAB/BG: CK 1057.  DRAIN/DEVICES: Pt removed PIV, 2nd IV was placed and pt took fork at lunch and poked it through the tubing.  SKIN: LIDIA, refused  TESTS/PROCEDURES: NA  D/C DAY/GOALS/PLACE: Pending psych eval. Pt is from a group home.  OTHER IMPORTANT INFO: Unable to complete admission questions. 72 hour hold. Psych consulted. Pt has attempted to leave a couple times today but was re-directable to return to room. Pt was started back on haldol as well.

## 2022-12-07 NOTE — CONSULTS
Triage and Transition - Consult and Liaison     Evans Zimmer  December 7, 2022    Psychiatry consult acknowledged.     Writer received communication from  that Mr. Zelaya's commitment has been renewed and she is waiting for the court revocation order of his provisional discharge to be signed by a  this will change his 72 hour hold status to a court hold status.     SYLVESTER DIXON MSW, [facility]  Triage and Transition - Consult and Liaison   [phone]
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
## 2022-12-07 NOTE — PROGRESS NOTES
Care Management Initial Consult    General Information  PATIENT IS UNDER A MI AND [address] ORDERED COMMITMENT THRU 12/14/2003.  Assessment completed with: Other, Pradip Bunn, manager of [facility]     Type of CM/SW Visit: Initial Assessment    Primary Care Provider verified and updated as needed:     Readmission within the last 30 days: no previous admission in last 30 days         Advance Care Planning:            Communication Assessment  Patient's communication style: spoken language (English or Bilingual)    Per manager of Pradip Randolph, patient speaks very little.       Cognitive  Cognitive/Neuro/Behavioral: .WDL except  Level of Consciousness: alert  Arousal Level: opens eyes spontaneously  Orientation: other (see comments) (LIDIA)  Mood/Behavior: withdrawn, calm          Living Environment:   People in home: other (see comments) (5 other male residents)     Current living Arrangements:        Able to return to prior arrangements: yes       Family/Social Support:  Care provided by: self, other (see comments) (staff give direction)  Provides care for:                  Description of Support System:  Marcos Zimmer  [phone]    Current Resources:   Patient receiving home care services:       Community Resources:  (patient has a [facility] )  Equipment currently used at home:    Supplies currently used at home:      Employment/Financial:  Employment Status:          Financial Concerns:             Lifestyle & Psychosocial Needs:  Social Determinants of Health     Tobacco Use: Not on file   Alcohol Use: Not on file   Financial Resource Strain: Not on file   Food Insecurity: Not on file   Transportation Needs: Not on file   Physical Activity: Not on file   Stress: Not on file   Social Connections: Not on file   Intimate Partner Violence: Not on file   Depression: Not on file   Housing Stability: Not on file       Functional Status:  Prior to admission patient needed assistance:               Mental Health Status:  Mental Health Status: Current Concern  Mental Health Management:  (patient is under a MI Commitment thru [facility].  Commitment first issue Cherie 15,2022 and extended yesterday on .  The new court order will  2023.  Patient is his own guardian.    Chemical Dependency Status:                Values/Beliefs:  Spiritual, Cultural Beliefs, Rastafarian Practices, Values that affect care:                 Additional Information:  Per Pradip Bunn, manager of [facility],patient moved in on .  He requires supervision for personal cares.  He needs to be shown where the bathroom is.     Mr Bunn said they arranged for a wander guard which arrived yesterday so when patient returns he will be wearing a wander guard bracelet so staff would be aware if patient attempted to leave as a alarm would go off.   Per Court Document, the original petition for MI and Padilla was presented by [facility]. (22 to 22). Discharged to a group home and displayed aggressive behavior, multiple elopments.  He returned to [facility] for treatment of ongoing aggressive behavior.     Writer asked Mr Bunn if patient has had any outbursts since admission and Pradip said patient has not.   Since patient has admitted they have tried to engage him in activities but patient has stayed to himself.   Pradip reports he is  able to accept patient back and will have the wander guard for patient.   Bedside RN shares she received a call from patient's daughter who does not want patient to return to the [facility] due to patient wandering away.    It is unknown if she is aware that [facility] will have the wander guard system in place.      Symone Zimmerman, [facility] mental health provider, in her note from today, indicates she spoke with patient's Behavioral CM who will be  revoking patient's provisional discharge to [facility] and ask the  to sign a court order for patient to be held here until a  discharge plan is in place. Given this information, patient will try to reach this CM and ascertain her plan for patient.    Plan: Will follow. Plan to speak with his Commitment CM and daughter.  GARLAND Davila

## 2022-12-08 LAB
ANION GAP SERPL CALCULATED.3IONS-SCNC: 5 MMOL/L (ref 3–14)
BUN SERPL-MCNC: 10 MG/DL (ref 7–30)
CALCIUM SERPL-MCNC: 8.2 MG/DL (ref 8.5–10.1)
CHLORIDE BLD-SCNC: 113 MMOL/L (ref 94–109)
CK SERPL-CCNC: 630 U/L (ref 30–300)
CO2 SERPL-SCNC: 24 MMOL/L (ref 20–32)
CREAT SERPL-MCNC: 0.79 MG/DL (ref 0.66–1.25)
GFR SERPL CREATININE-BSD FRML MDRD: >90 ML/MIN/1.73M2
GLUCOSE BLD-MCNC: 123 MG/DL (ref 70–99)
POTASSIUM BLD-SCNC: 3.9 MMOL/L (ref 3.4–5.3)
SODIUM SERPL-SCNC: 142 MMOL/L (ref 133–144)

## 2022-12-08 PROCEDURE — 99233 SBSQ HOSP IP/OBS HIGH 50: CPT | Performed by: HOSPITALIST

## 2022-12-08 PROCEDURE — 120N000001 HC R&B MED SURG/OB

## 2022-12-08 PROCEDURE — 36415 COLL VENOUS BLD VENIPUNCTURE: CPT | Performed by: HOSPITALIST

## 2022-12-08 PROCEDURE — 250N000013 HC RX MED GY IP 250 OP 250 PS 637: Performed by: HOSPITALIST

## 2022-12-08 PROCEDURE — 82550 ASSAY OF CK (CPK): CPT | Performed by: HOSPITALIST

## 2022-12-08 PROCEDURE — 82310 ASSAY OF CALCIUM: CPT | Performed by: HOSPITALIST

## 2022-12-08 RX ADMIN — HALOPERIDOL 5 MG: 5 TABLET ORAL at 08:00

## 2022-12-08 RX ADMIN — POLYETHYLENE GLYCOL 3350 17 G: 17 POWDER, FOR SOLUTION ORAL at 08:00

## 2022-12-08 RX ADMIN — Medication 2000 UNITS: at 08:00

## 2022-12-08 RX ADMIN — HALOPERIDOL 5 MG: 5 TABLET ORAL at 22:21

## 2022-12-08 ASSESSMENT — ACTIVITIES OF DAILY LIVING (ADL)
ADLS_ACUITY_SCORE: 43
ADLS_ACUITY_SCORE: 41
ADLS_ACUITY_SCORE: 43
ADLS_ACUITY_SCORE: 41

## 2022-12-08 NOTE — PLAN OF CARE
"Goal Outcome Evaluation:      Plan of Care Reviewed With: patient    Overall Patient Progress: no change    DATE & TIME:  12/7/22 2228-8779  Cognitive Concerns/ Orientation : LIDIA, pt inconsistent with answering questions. Quiet, talks in a whisper. Alert. Flat affect. Cooperative and redirectable   BEHAVIOR & AGGRESSION TOOL COLOR: Green  ABNL VS/O2: VSS on RA   MOBILITY: SBA  PAIN MANAGMENT: Denies, absence of nonverbal pain indicators   DIET: Regular  BOWEL/BLADDER: Continent  ABNL LAB/BG: CK 1057.  DRAIN/DEVICES: L PIV infusing NS+20KCl @100mL/hr, needs reminders to not peel IV dressing  SKIN: LIDIA, refused  TESTS/PROCEDURES: NA  D/C DAY/GOALS/PLACE: Pending psych eval. Pt is from group home. Per SW, \"patient's Behavioral CM will be revoking patient's provisional discharge to [facility] and ask the  to sign a court order for patient to be held here until a discharge plan is in place\"  OTHER IMPORTANT INFO: Unable to complete admission questions. 72 hour hold. Psych consulted.   "

## 2022-12-08 NOTE — PLAN OF CARE
"Goal Outcome Evaluation:                      Summary: AMS, Wanders, Cold exposure, [place], From Group home. 72hr Hold on court order status.  DATE & TIME:  12/8/22 1600  Cognitive Concerns/ Orientation : LIDIA, pt inconsistent with answering questions. Quiet, talks in a whisper. Alert. Flat affect. Cooperative and redirectable   BEHAVIOR & AGGRESSION TOOL COLOR: Green  ABNL VS/O2: VSS on RA   MOBILITY: SBA  PAIN MANAGMENT: Denies, absence of nonverbal pain indicators   DIET: Regular, eating better today.  BOWEL/BLADDER: Continent  ABNL LAB/BG: .  DRAIN/DEVICES: IVF stopped, so far pt has not pulled out IV SL.  SKIN: LIDIA, refused  TESTS/PROCEDURES: NA  D/C DAY/GOALS/PLACE: Pending psych eval. Pt is from group home. Per SW, \"patient's Behavioral CM will be revoking patient's provisional discharge to [facility] and ask the  to sign a court order for patient to be held here until a discharge plan is in place\"  OTHER IMPORTANT INFO: Unable to complete admission questions. 72 hour hold. Psych consulted.   "

## 2022-12-08 NOTE — PROGRESS NOTES
Care Management Follow Up    Length of Stay (days): 3    Expected Discharge Date: 12/09/2022     Concerns to be Addressed:       Patient plan of care discussed at interdisciplinary rounds: Yes    Anticipated Discharge Disposition:  To be secure memory care unit.     Anticipated Discharge Services:    Anticipated Discharge DME:      Patient/family educated on Medicare website which has current facility and service quality ratings:    Education Provided on the Discharge Plan:    Patient/Family in Agreement with the Plan:      Referrals Placed by CM/SW:    Private pay costs discussed: Not applicable    Additional Information:  Patient's Targeted  under his MI Commitment is Vikaalida Ramirez [phone].   Today, writer did receive the Court Order indicating patient's Provisional Discharge to [facility] has been revoke and he is to be held here until a new plan is in place.  Document is in patient's paper chart under the legal index.   Ms Ramirez is not comfortable with patient returning to the [facility] as the house is not secure. Patient does have a frequent history of eloping from his previous group home in addition to the elopement at [facility]. She explains patient generally tries to leave his environment when his delusions tell him his daughter, who lives in [place], is in danger.    She is concerned with the cold temperatures and another elopement could be life threatening for patient.  She is making a referral to a memory care facility which is secure.    Ms Ramirez speaks frequently with patient's daughter who lives in [place].  She will speak with her today.  She has encouraged daughter to petition for guardianship. This conversation continues.     Plan:  Will speak with Ms Ramirez tomorrow for placement update.     WALE DavilaSW       no

## 2022-12-08 NOTE — PROGRESS NOTES
"[facility]    Hospitalist Progress Note      Assessment & Plan   Evans Zimmer is a 57 year old male resident of senior living, presented with altered mental status, cold exposure per EMS.    History major neurocognitive disorder, no history elicited from patient  Behavioral issue, wandering from Group Home  Cold exposure x 24 hours; mild rhabdo; admission CK 1215. .  No history from patient, primarily nonverbal, per EMS lives in group home, last seen yesterday day before admission then wandered off from group home.  Found in [facility] by EMS, cold exposure overnight.  At this time no history from group home.  Review of Care Everywhere indicates last hospitalization [facility] in August 2022 [8/-/11/25/22] essentially presenting the same, nonsensical, loose association, seen by Psychiatry with diagnosis of \"major neurocognitive disorder\" notation of antisocial behavior, history of opiate use disorder, previously on methadone maintenance, admission UDS negative; current medication awaiting input from group home.   On that hospitalization there was notation of possible Padilla/commitment however does not appear this was done and patient discharged to group home.  In ED patient reported he was cold, no signs of cold exposure, core temperature normal after warming blankets however CK elevated at 1215; normal CR  .In ED reports of acute aggressive behavior.  In discussing with nurse it appears that he needed to urinate and could not be consoled, became physically aggressive.  ED initiated four-point physical restraints and IV Ativan.  -Sitter 1: 1, monitor behaviors.  Continue hold status per ED  -Psychiatry consult  -At this time mentation appears at baseline and patient could not comply to head CT without significant sedation; monitor  -IVF, BMP and CK in AM; WNL. No able to eat; discontinue IVF  -PRN IV Ativan for acute aggressive behavior.  Need to assess patient's ability to comply to " "p.o.PRN medications, i.e. Zyprexa or Seroquel.  Need to obtain from Group Home PTA maintenance medications.  Defer to Psychiatry if need for any scheduled antipsychotic medications.  As acute agitation ED was secondary to need to urinate, scheduled toileting as it appears patient cannot communicate wants/needs.  Ambulation/walks around unit with gait belt and staff as patient desires and staff available to prevent agitation.  -Attempt reassurance/redirection.  -12/7/2022 mental health notes states \"commitment has been renewed and awaiting court revocation order of his provisional discharge to be signed by a  that would change his 72-hour hold status to court ordered status.'  -Received PTA maintenance medications from group home that includes Haldol 5 mg twice daily, we will initiate and monitor.  Continued scheduled toileting as patient cannot states wants and needs as well as ambulation with staff and gait belt as staff is available to prevent agitation   -RRT called earlier 12/7/2022 as he attempted to leave his room on his own.  Again his behavior is interpreted as aggressive however he cannot express his wants and needs.  Scheduled toileting and ambulate with Sitter as available as patient cannot express his wants/needs to decrease episodes of agitation..  -CK increased to 1057, IVF resumed 12/7/2022 however appears that patient does not take adequate p.o.'s, restart IVF NS with 20 mEq KCl at 100 cc an hour, recheck CK and BMP in AM.  Encourage fluids.  -12/8/2022 CK decreased to 630 with IVF.  On discussion with patient and sitter it appears that he is taking in adequate p.o.'s and on encounter was able to voice need for toileting and wanting ambulation.  No excess sedation noted on restart of PTA schedule Haldol twice daily.  DC IVF, encourage p.o. fluids, recheck CK in AM.  Regarding disposition, SW note reviewed, it appears that daughter does not want patient to return to prior group home due to found " outside of home, cold exposure.  Therefore it appears that provisional discharge to prior group home is revoked and  to signed court ordered commitment withheld until discharge plan in place.    History hepatitis C, historical  -Noted     -History hearing impairment, unable to assess, Patient unable to respond to questioning;  however may be contributory to behavioral issues.  -Patient responsive to questioning without obvious hearing impairment at conversational level.  Follow-up auditory acuity per PCP.    DVT Prophylaxis: Ambulate every shift  Code Status: Full Code    Expected discharge:  >2 days pending clinical improvement rhabdomyolysis, adequate p.o. intake without IV support, safe disposition and  signed committment.    Sreekanth Foote MD  Text Page (7am - 6pm, M-F)    Total unit/floor time 35 minutes:  time consisted of the following, examination of patient, review of records including labs, imaging results, medications, interdisciplinary notes and completing documentation; > 50% Coordination of Care time with Nursing and Sitter regarding CK/rhabdomyolysis, behaviors care plan, management and surveillance; discharge planning.      Interval History   Patient appears more awake, engaged in conversation, responses appropriate with 1-2 word answers.  Was able to voice needs and wants now including toileting, desire for ambulation.  Sitter to assist.  Patient and sitter notes patient taking in p.o.'s, no acute behavioral issues, review of records indicates no need for PRN sedation.  Restarted on PTA schedule Haldol twice daily, no excess sedation noted.  Monitor.    SH: No tobacco. Lives in , as above daughter does not want patient to return to PTA Group home.    ROS: Complete ROS negative except as above.     -Data reviewed today: I reviewed all new labs and imaging results over the last 24 hours.  Physical Exam   Temp: 97.4  F (36.3  C) Temp src: Axillary BP: 115/74 Pulse: 69   Resp: 17 SpO2:  99 % O2 Device: None (Room air)    Vitals:    12/05/22 1300   Weight: 86.3 kg (190 lb 4.1 oz)     General/Constitutional:  NAD, more awake, calm, cooperative         HEENT/Head Exam:  atraumatic  Eyes:  PERRL, no conjunctivits  Mouth/Oral Pharynx:  Buccal mucosa WNL  Chest/Respiratory: Respiration nonlabored on RA.  Cardiovascular:  no murmur appreciated.  LE edema none  Gastrointestinal/Abdomen:  soft, nontender,no rebound, guarding or other peritoneal signs.  Musculoskeletal:  extremities warm, dry, noncyanotic  Neurologic:  gross motor testing nonfocal.    Observed ambulation on unit, gait belt, standby assist.  Psychiatric:  Mental status: Awake; orientation not tested. affect calm     Medications       haloperidol  5 mg Oral BID     polyethylene glycol  17 g Oral Daily     sodium chloride (PF)  3 mL Intracatheter Q8H     Vitamin D3  2,000 Units Oral Daily       Data   Recent Labs   Lab 12/08/22  0810 12/07/22  0852 12/06/22  0851 12/06/22  0812 12/06/22  0721 12/05/22  2330 12/05/22  1015   WBC  --  5.1  --   --   --   --  11.9*   HGB  --  13.8  --   --   --   --  14.5   MCV  --  89  --   --   --   --  87   PLT  --  149*  --   --   --   --  166    141  --   --  141  --  143   POTASSIUM 3.9 3.5  --   --  4.0  --  4.0   CHLORIDE 113* 113*  --   --  114*  --  107   CO2 24 23  --   --  25  --  25   BUN 10 13  --   --  18  --  24   CR 0.79 0.81  --   --  0.81  --  1.04   ANIONGAP 5 5  --   --  2*  --  11   ALCIRA 8.2* 8.2*  --   --  8.4*  --  9.6   * 92 123*   < > 76   < > 74   ALBUMIN  --   --   --   --   --   --  3.9   PROTTOTAL  --   --   --   --   --   --  8.3   BILITOTAL  --   --   --   --   --   --  1.4*   ALKPHOS  --   --   --   --   --   --  51   ALT  --   --   --   --   --   --  28   AST  --   --   --   --   --   --  39    < > = values in this interval not displayed.

## 2022-12-09 PROCEDURE — 99231 SBSQ HOSP IP/OBS SF/LOW 25: CPT | Performed by: HOSPITALIST

## 2022-12-09 PROCEDURE — 250N000013 HC RX MED GY IP 250 OP 250 PS 637: Performed by: HOSPITALIST

## 2022-12-09 PROCEDURE — 120N000001 HC R&B MED SURG/OB

## 2022-12-09 RX ADMIN — HALOPERIDOL 5 MG: 5 TABLET ORAL at 21:13

## 2022-12-09 RX ADMIN — HALOPERIDOL 5 MG: 5 TABLET ORAL at 09:14

## 2022-12-09 RX ADMIN — Medication 2000 UNITS: at 09:14

## 2022-12-09 ASSESSMENT — ACTIVITIES OF DAILY LIVING (ADL)
ADLS_ACUITY_SCORE: 41

## 2022-12-09 NOTE — PLAN OF CARE
"Goal Outcome Evaluation:      Plan of Care Reviewed With: patient    Overall Patient Progress: no change    DATE & TIME: 22 4849-9384  Cognitive Concerns/ Orientation : LIDIA, pt inconsistent with answering questions. Quiet, talks in a whisper. Alert. Flat affect. Cooperative and redirectable   BEHAVIOR & AGGRESSION TOOL COLOR: Green  ABNL VS/O2: VSS on RA   MOBILITY: SBA. Ambulated in halls  PAIN MANAGMENT: Denies, absence of nonverbal pain indicators   DIET: Regular   BOWEL/BLADDER: Continent  ABNL LAB/BG: .  DRAIN/DEVICES: None  SKIN: LIDIA, refused  TESTS/PROCEDURES: NA  D/C DAY/GOALS/PLACE: [place] has been revoked. Pt will be held here until locked memory care can be found. SW and MI Commitment  following for placement.   OTHER IMPORTANT INFO: Unable to complete admission questions. Psych following. 72 hour hold . Per MD, \" to signed court ordered commitment withheld until discharge plan in place.\"  "

## 2022-12-09 NOTE — PLAN OF CARE
"Goal Outcome Evaluation:     Summary: AMS, Wanders, Cold exposure, [place], From Group [place].   DATE & TIME: 22 6509-4512  Cognitive Concerns/ Orientation : LIDIA, pt inconsistent with answering questions. Quiet, talks in a whisper. Alert. Flat affect. Cooperative and redirectable.  Walking halls most of shift   BEHAVIOR & AGGRESSION TOOL COLOR: Green  ABNL VS/O2: VSS on RA   MOBILITY: SBA.   PAIN MANAGMENT: Denies, absence of nonverbal pain indicators   DIET: Regular   BOWEL/BLADDER: Continent  ABNL LAB/BG: , redraw ordered but after one failed attempt, pt refused a second.  DRAIN/DEVICES: None  SKIN: LIDIA, refused  TESTS/PROCEDURES: CK recheck ordered- pt refused another needle stick after first blood draw unsuccessful   D/C DAY/GOALS/PLACE: Provisional discharge to [facility] has been revoked. Pt will be held here until locked memory care can be found. SW and MI Commitment  following for placement.   OTHER IMPORTANT INFO: Unable to complete admission questions. Psych following. 72 hour hold . Per MD, \" to signed court ordered commitment withheld until discharge plan in place.\"                  "

## 2022-12-09 NOTE — PROGRESS NOTES
Care Management Follow Up    Length of Stay (days): 4    Expected Discharge Date: 12/10/2022     Concerns to be Addressed:       Patient plan of care discussed at interdisciplinary rounds: Yes    Anticipated Discharge Disposition:  Admission to a secured care facility     Anticipated Discharge Services:    Anticipated Discharge DME:      Patient/family educated on Medicare website which has current facility and service quality ratings:    Education Provided on the Discharge Plan:    Patient/Family in Agreement with the Plan:      Referrals Placed by CM/SW:    Private pay costs discussed:     Additional Information:  Placed a call to Patient's Targeted  under his MI Commitment  Vika Ramirez [phone].  Left her a message asking if she has made any progress in finding patient a secured care center.    Patient remains under a court hold and will be here till a safe discharge plan is in place. Ms Ramirez communicates with daughter on a regular basis.       WALE DavilaSW

## 2022-12-09 NOTE — PROGRESS NOTES
"[facility]    Hospitalist Progress Note      Assessment & Plan   Evans Zimmer is a 57 year old male resident of assisted, presented with altered mental status, cold exposure per EMS.    History major neurocognitive disorder, no history elicited from patient  Behavioral issue, wandering from Group Home  Cold exposure x 24 hours; mild rhabdo; admission CK 1215. .  No history from patient, primarily nonverbal, per EMS lives in group home, last seen yesterday day before admission then wandered off from group home.  Found in [facility] by EMS, cold exposure overnight.  At this time no history from group home.  Review of Care Everywhere indicates last hospitalization [facility] [place] in August 2022 [8/-/11/25/22] essentially presenting the same, nonsensical, loose association, seen by Psychiatry with diagnosis of \"major neurocognitive disorder\" notation of antisocial behavior, history of opiate use disorder, previously on methadone maintenance, admission UDS negative; current medication awaiting input from group home.   On that hospitalization there was notation of possible Padilla/commitment however does not appear this was done and patient discharged to group home.  In ED patient reported he was cold, no signs of cold exposure, core temperature normal after warming blankets however CK elevated at 1215; normal CR  .In ED reports of acute aggressive behavior.  In discussing with nurse it appears that he needed to urinate and could not be consoled, became physically aggressive.  ED initiated four-point physical restraints and IV Ativan.  -Sitter 1: 1, monitor behaviors.  Continue hold status per ED  -Psychiatry consult  -At this time mentation appears at baseline and patient could not comply to head CT without significant sedation; monitor  -IVF, BMP and CK in AM; WNL. No able to eat; discontinue IVF  -PRN IV Ativan for acute aggressive behavior.  Need to assess patient's ability to comply to " "p.o.PRN medications, i.e. Zyprexa or Seroquel.  Need to obtain from Group Home PTA maintenance medications.  Defer to Psychiatry if need for any scheduled antipsychotic medications.  As acute agitation ED was secondary to need to urinate, scheduled toileting as it appears patient cannot communicate wants/needs.  Ambulation/walks around unit with gait belt and staff as patient desires and staff available to prevent agitation.  -Attempt reassurance/redirection.  -12/7/2022 mental health notes states \"commitment has been renewed and awaiting court revocation order of his provisional discharge to be signed by a  that would change his 72-hour hold status to court ordered status.'  -Received PTA maintenance medications from group home that includes Haldol 5 mg twice daily, we will initiate and monitor.  Continued scheduled toileting as patient cannot states wants and needs as well as ambulation with staff and gait belt as staff is available to prevent agitation   -RRT called earlier 12/7/2022 as he attempted to leave his room on his own.  Again his behavior is interpreted as aggressive however he cannot express his wants and needs.  Scheduled toileting and ambulate with Sitter as available as patient cannot express his wants/needs to decrease episodes of agitation..  -CK increased to 1057, IVF resumed 12/7/2022 however appears that patient does not take adequate p.o.'s, restart IVF NS with 20 mEq KCl at 100 cc an hour, recheck CK and BMP in AM.  Encourage fluids.  -12/8/2022 CK decreased to 630 with IVF.  On discussion with patient and sitter it appears that he is taking in adequate p.o.'s and on encounter was able to voice need for toileting and wanting ambulation.  No excess sedation noted on restart of PTA schedule Haldol twice daily.  DC IVF, encourage p.o. fluids, recheck CK in AM.  Regarding disposition, SW note reviewed, it appears that  does not support and daughter does not want patient to return " to prior group home due to found outside of home, cold exposure.  Therefore, provisional discharge to prior group home is revoked and  to signed court ordered commitment withheld until discharge plan in place.  SW on going discussion with his manager.  -12/9/2022 nursing notes no acute behavioral issues, patient cooperative, able to voice wants/needs, toileting, ambulation, no excess PTA sedation on scheduled Haldol.  Needs updated CK, pending.    History hepatitis C, historical  -Noted     -History hearing impairment, unable to assess, Patient unable to respond to questioning;  however may be contributory to behavioral issues.  -Patient responsive to questioning without obvious hearing impairment at conversational level.  Follow-up auditory acuity per PCP.    DVT Prophylaxis: Ambulate every shift  Code Status: Full Code    Expected discharge:  >2 days pending clinical improvement rhabdomyolysis, adequate p.o. intake without IV support, safe disposition and  signed committment.    Sreekanth Foote MD  Text Page (7am - 6pm, M-F)      Interval History   Patient more awake, ambulatory with aide; per sitter able to voice needs for toileting and wanting ambulation.  No reported acute behavioral issues.  No excess sedation.  Taking in p.o.'s.  Reported poor venous access, needs blood sample for repeat CK.     SH: No tobacco. Lives in , as above daughter does not want patient to return to [facility] Group home.    ROS: Complete ROS negative except as above.     -Data reviewed today: I reviewed all new labs and imaging results over the last 24 hours.  Physical Exam   Temp: 98  F (36.7  C) Temp src: Oral BP: 135/78 Pulse: 68   Resp: 16 SpO2: 100 % O2 Device: None (Room air)    Vitals:    12/05/22 1300   Weight: 86.3 kg (190 lb 4.1 oz)     General/Constitutional:  NAD, more awake, calm, cooperative         HEENT/Head Exam:  atraumatic  Eyes:  PERRL, no conjunctivits  Mouth/Oral Pharynx:  Buccal mucosa  WNL  Chest/Respiratory: Respiration nonlabored on RA.  Cardiovascular:  no murmur appreciated.  LE edema none  Gastrointestinal/Abdomen:  soft, nontender,no rebound, guarding or other peritoneal signs.  Musculoskeletal:  extremities warm, dry, noncyanotic  Neurologic:  gross motor testing nonfocal.    Observed ambulation on unit, standby assist.  Psychiatric:  Mental status: Awake; orientation not tested. affect calm     Medications       haloperidol  5 mg Oral BID     polyethylene glycol  17 g Oral Daily     sodium chloride (PF)  3 mL Intracatheter Q8H     Vitamin D3  2,000 Units Oral Daily       Data   Recent Labs   Lab 12/08/22  0810 12/07/22  0852 12/06/22  0851 12/06/22  0812 12/06/22  0721 12/05/22  2330 12/05/22  1015   WBC  --  5.1  --   --   --   --  11.9*   HGB  --  13.8  --   --   --   --  14.5   MCV  --  89  --   --   --   --  87   PLT  --  149*  --   --   --   --  166    141  --   --  141  --  143   POTASSIUM 3.9 3.5  --   --  4.0  --  4.0   CHLORIDE 113* 113*  --   --  114*  --  107   CO2 24 23  --   --  25  --  25   BUN 10 13  --   --  18  --  24   CR 0.79 0.81  --   --  0.81  --  1.04   ANIONGAP 5 5  --   --  2*  --  11   ALCIRA 8.2* 8.2*  --   --  8.4*  --  9.6   * 92 123*   < > 76   < > 74   ALBUMIN  --   --   --   --   --   --  3.9   PROTTOTAL  --   --   --   --   --   --  8.3   BILITOTAL  --   --   --   --   --   --  1.4*   ALKPHOS  --   --   --   --   --   --  51   ALT  --   --   --   --   --   --  28   AST  --   --   --   --   --   --  39    < > = values in this interval not displayed.

## 2022-12-10 PROCEDURE — 120N000001 HC R&B MED SURG/OB

## 2022-12-10 PROCEDURE — 250N000013 HC RX MED GY IP 250 OP 250 PS 637: Performed by: HOSPITALIST

## 2022-12-10 PROCEDURE — 99231 SBSQ HOSP IP/OBS SF/LOW 25: CPT | Performed by: HOSPITALIST

## 2022-12-10 RX ORDER — AMOXICILLIN 250 MG
1 CAPSULE ORAL 2 TIMES DAILY PRN
Status: DISCONTINUED | OUTPATIENT
Start: 2022-12-10 | End: 2022-12-14 | Stop reason: HOSPADM

## 2022-12-10 RX ADMIN — HALOPERIDOL 5 MG: 5 TABLET ORAL at 09:34

## 2022-12-10 RX ADMIN — HALOPERIDOL 5 MG: 5 TABLET ORAL at 20:56

## 2022-12-10 RX ADMIN — POLYETHYLENE GLYCOL 3350 17 G: 17 POWDER, FOR SOLUTION ORAL at 09:34

## 2022-12-10 RX ADMIN — Medication 2000 UNITS: at 09:34

## 2022-12-10 ASSESSMENT — ACTIVITIES OF DAILY LIVING (ADL)
ADLS_ACUITY_SCORE: 41

## 2022-12-10 NOTE — PROGRESS NOTES
"[facility]    Hospitalist Progress Note      Assessment & Plan   Evans Zimmer is a 57 year old male resident of correction, presented with altered mental status, cold exposure per EMS.    History major neurocognitive disorder, no history elicited from patient  Behavioral issue, wandering from Group Home  Cold exposure x 24 hours; mild rhabdo; admission CK 1215. .  No history from patient, primarily nonverbal, per EMS lives in group home, last seen yesterday day before admission then wandered off from group home.  Found in [facility] by EMS, cold exposure overnight.  At this time no history from group home.  Review of Care Everywhere indicates last hospitalization [facility] [place] in August 2022 [8/-/11/25/22] essentially presenting the same, nonsensical, loose association, seen by Psychiatry with diagnosis of \"major neurocognitive disorder\" notation of antisocial behavior, history of opiate use disorder, previously on methadone maintenance, admission UDS negative; current medication awaiting input from group home.   On that hospitalization there was notation of possible Padilla/commitment however does not appear this was done and patient discharged to group home.  In ED patient reported he was cold, no signs of cold exposure, core temperature normal after warming blankets however CK elevated at 1215; normal CR  .In ED reports of acute aggressive behavior.  In discussing with nurse it appears that he needed to urinate and could not be consoled, became physically aggressive.  ED initiated four-point physical restraints and IV Ativan.  -Sitter 1: 1, monitor behaviors.  Continue hold status per ED  -Psychiatry consult  -At this time mentation appears at baseline;  patient could not comply to head CT without significant sedation; monitor  -IVF, BMP and CK in AM; WNL. Now able to eat; discontinue IVF   As acute agitation ED was secondary to need to urinate, scheduled toileting as it appears " patient cannot communicate wants/needs.  Ambulation/walks around unit with gait belt and staff as patient desires and staff available to prevent agitation.  -Attempt reassurance/redirection.'  -Received PTA maintenance medications from group home that includes Haldol 5 mg twice daily,; no excess sedation noted, no acute agitation noted since prescribed PTA schedule Haldo.   -CK serially decreased; pt taking po; IVF discharge as pt taking po's and lost PIV  -  Regarding disposition, SW note reviewed, it appears that  does not support and daughter does not want patient to return to prior group home due to found outside of home, cold exposure.  Therefore, provisional discharge to prior group home is revoked and  to signed court ordered commitment withheld until safe discharge plan in place.  SW on going discussion with his manager.      History hepatitis C, historical  -Noted     -History hearing impairment, unable to assess, Patient unable to respond to questioning;  however may be contributory to behavioral issues.  -Patient responsive to questioning without obvious hearing impairment at conversational level.  Follow-up auditory acuity per PCP.    Constipation  Sitter reports poor appetite and intake, review of records indicates last BM 4 days ago.  No associated GI symptoms, abdominal exam benign.  Start scheduled MiraLAX, as needed senna, will add MOM and if needed.  Monitor.      DVT Prophylaxis: Ambulate every shift  Code Status: Full Code    Expected discharge:  >2 days pending clinical improvement, adequate p.o. intake without IV support, safe disposition and  signed committment.    Sreekanth Foote MD  Text Page (7am - 6pm, M-F)      Interval History   Patient more awake, ambulatory with aide, sitter states able to voice need for toileting, wants/needs.  No acute behavioral issues, or aggression.  Sitter notes poor p.o. appetite and intake.  No abdominal pain, nausea, emesis.  Review  of records indicates last BM 4 days ago.  Start scheduled and as needed bowel meds, monitor.       SH: No tobacco. Lives in , as above daughter does not want patient to return to [facility] Group home.    ROS: Complete ROS negative except as above.     -Data reviewed today: I reviewed all new labs and imaging results over the last 24 hours.  Physical Exam   Temp: 97.3  F (36.3  C) Temp src: Oral BP: (!) 130/99 Pulse: 65   Resp: 18 SpO2: 100 % O2 Device: None (Room air)    Vitals:    12/05/22 1300   Weight: 86.3 kg (190 lb 4.1 oz)     General/Constitutional:  NAD, more awake, calm, cooperative       HEENT/Head Exam:  atraumatic  Eyes:  PERRL, no conjunctivits  Mouth/Oral Pharynx:  Buccal mucosa WNL  Chest/Respiratory: Respiration nonlabored on RA.  Cardiovascular:  no murmur appreciated.  LE edema none  Gastrointestinal/Abdomen:  soft, nontender,no rebound, guarding or other peritoneal signs.  Neurologic:  gross motor testing nonfocal.    Observed ambulation on unit, standby assist.  Psychiatric:  Mental status: Awake; orientation not tested. affect calm     Medications       haloperidol  5 mg Oral BID     polyethylene glycol  17 g Oral Daily     sodium chloride (PF)  3 mL Intracatheter Q8H     Vitamin D3  2,000 Units Oral Daily       Data   Recent Labs   Lab 12/08/22  0810 12/07/22  0852 12/06/22  0851 12/06/22  0812 12/06/22  0721 12/05/22  2330 12/05/22  1015   WBC  --  5.1  --   --   --   --  11.9*   HGB  --  13.8  --   --   --   --  14.5   MCV  --  89  --   --   --   --  87   PLT  --  149*  --   --   --   --  166    141  --   --  141  --  143   POTASSIUM 3.9 3.5  --   --  4.0  --  4.0   CHLORIDE 113* 113*  --   --  114*  --  107   CO2 24 23  --   --  25  --  25   BUN 10 13  --   --  18  --  24   CR 0.79 0.81  --   --  0.81  --  1.04   ANIONGAP 5 5  --   --  2*  --  11   ALCIRA 8.2* 8.2*  --   --  8.4*  --  9.6   * 92 123*   < > 76   < > 74   ALBUMIN  --   --   --   --   --   --  3.9   PROTTOTAL  --   --    --   --   --   --  8.3   BILITOTAL  --   --   --   --   --   --  1.4*   ALKPHOS  --   --   --   --   --   --  51   ALT  --   --   --   --   --   --  28   AST  --   --   --   --   --   --  39    < > = values in this interval not displayed.

## 2022-12-10 NOTE — PLAN OF CARE
"Goal Outcome Evaluation:      Plan of Care Reviewed With: patient    Overall Patient Progress: no changeOverall Patient Progress: no change     Summary: AMS, [place], Cold exposure, [place], From Group [place].   DATE & TIME: -12/10 7093-0468  Cognitive Concerns/ Orientation : LIDIA, pt inconsistent with answering questions. Quiet, talks in a whisper. Alert. Flat affect. Cooperative and redirectable. Slept throughout the night   BEHAVIOR & AGGRESSION TOOL COLOR: Green  ABNL VS/O2: VSS on RA   MOBILITY: SBA  PAIN MANAGMENT: Denies, absence of nonverbal pain indicators   DIET: Regular   BOWEL/BLADDER: Continent  ABNL LAB/BG:   DRAIN/DEVICES: None  SKIN: LIDIA full skin assessment. Denies any abdominal pain, abdomen soft and non tender.   TESTS/PROCEDURES: --  D/C DAY/GOALS/PLACE: Provisional discharge to [facility] has been revoked. Pt will be held here until locked memory care can be found. SW and MI Commitment  following for placement.   OTHER IMPORTANT INFO: Unable to complete admission questions. Psych following. 72 hour hold . Per MD, \" to signed court ordered commitment withheld until discharge plan in place.\" Door alarm on, sitter sitting outside of patient's room. No aggressive behaviors noted this shift.       "

## 2022-12-11 PROCEDURE — 99231 SBSQ HOSP IP/OBS SF/LOW 25: CPT | Performed by: HOSPITALIST

## 2022-12-11 PROCEDURE — 120N000001 HC R&B MED SURG/OB

## 2022-12-11 PROCEDURE — 250N000013 HC RX MED GY IP 250 OP 250 PS 637: Performed by: HOSPITALIST

## 2022-12-11 RX ORDER — AMLODIPINE BESYLATE 5 MG/1
5 TABLET ORAL DAILY
Status: DISCONTINUED | OUTPATIENT
Start: 2022-12-11 | End: 2022-12-14 | Stop reason: HOSPADM

## 2022-12-11 RX ADMIN — Medication 2000 UNITS: at 08:27

## 2022-12-11 RX ADMIN — HALOPERIDOL 5 MG: 5 TABLET ORAL at 08:27

## 2022-12-11 RX ADMIN — AMLODIPINE BESYLATE 5 MG: 5 TABLET ORAL at 12:33

## 2022-12-11 RX ADMIN — HALOPERIDOL 5 MG: 5 TABLET ORAL at 20:48

## 2022-12-11 ASSESSMENT — ACTIVITIES OF DAILY LIVING (ADL)
ADLS_ACUITY_SCORE: 41

## 2022-12-11 NOTE — PLAN OF CARE
Goal Outcome Evaluation:       Summary: AMS, Cold exposure,[place]do.  [place], From Group home.   DATE & TIME:12/11/22 9214-1862  Cognitive Concerns/ Orientation : A&Ox2 (self/time), pt inconsistent with answering questions. Quiet, talks in a whisper. Alert. Flat affect. Cooperative and redirectable.   BEHAVIOR & AGGRESSION TOOL COLOR: Green  ABNL VS/O2: Qshift VS. VSS on RA. Started on amlodipine   MOBILITY: IND/ SBA d/t wandering  PAIN MANAGMENT: Denies, absence of nonverbal pain indicators   DIET: Regular, fair appetite  BOWEL/BLADDER: Continent  ABNL LAB/BG:   DRAIN/DEVICES: None  SKIN: LIDIA full skin assessment. Denies any abdominal pain, abdomen soft and non tender.   TESTS/PROCEDURES: --  D/C DAY/GOALS/PLACE:  Pt will be held here until locked memory care can be found. SW and MI Commitment  following for placement.   OTHER IMPORTANT INFO: Unable to complete admission questions. Psych following. Door alarm on, sitter sitting outside of patient's room. No aggressive behaviors noted this shift.

## 2022-12-11 NOTE — PROGRESS NOTES
"[facility]    Hospitalist Progress Note      Assessment & Plan   Evans Zimmer is a 57 year old male resident of correction, presented with altered mental status, cold exposure per EMS.    History major neurocognitive disorder, no history elicited from patient  Behavioral issue, wandering from Group Home  Cold exposure x 24 hours; mild rhabdo; resolving  No history from patient, primarily nonverbal, per EMS lives in group home, last seen yesterday day before admission then wandered off from group home.  Found in [facility] by EMS, cold exposure overnight.  At this time no history from group home.  Review of Care Everywhere indicates [place] [facility] [place] [8/-/11/25/22] essentially presenting the same, nonsensical, loose association, seen by Psychiatry with diagnosis of \"major neurocognitive disorder\" notation of antisocial behavior, history of opiate use disorder, previously on methadone maintenance, admission UDS negative; current medication awaiting input from group home.   On that hospitalization there was notation of Padilla/commitment; patient discharged to group home.  In ED patient reported he was cold, no signs of cold exposure, core temperature normal after warming blankets however CK elevated at 1215; normal CR  .In ED reports of acute aggressive behavior.  In discussing with nurse it appears that he needed to urinate and could not be consoled, became physically aggressive.  ED initiated four-point physical restraints and IV Ativan.  -Sitter 1: 1, monitor behaviors.  Continue hold status per ED  -At this time mentation appears at baseline;  patient could not comply to head CT without significant sedation; monitor  -IVF, BMP and CK in AM; WNL. Now able to eat; discontinue IVF   As acute agitation ED was secondary to need to urinate, scheduled toileting as it appears patient cannot communicate wants/needs.  Ambulation/walks around unit with gait belt and " staff as patient desires and staff available to prevent agitation.  -Attempt reassurance/redirection.  -Received PTA maintenance medications from group home that includes Haldol 5 mg twice daily,; no excess sedation noted, no acute agitation noted since prescribed PTA schedule Haldol.   -CK serially decreased; pt taking po; IVF discontinued as pt taking po's and lost PIV; does not allow venipuncture  -  Regarding disposition, SW note reviewed, it appears that  does not support and daughter does not want patient to return to prior group home due to found outside of home, cold exposure.  Therefore, provisional discharge to prior group home is revoked and  to signed court ordered commitment withheld until safe discharge plan in place.  SW on going discussion with his manager.    History hepatitis C, historical  -Noted     -History hearing impairment, unable to assess, Patient unable to respond to questioning;  however may be contributory to behavioral issues.  -Patient responsive to questioning without obvious hearing impairment at conversational level.  Follow-up auditory acuity per PCP.    Constipation  Sitter reports poor appetite and intake, review of records indicates last BM 4 days ago.  No associated GI symptoms, abdominal exam benign.  Start scheduled MiraLAX, as needed senna, will add MOM. Pt had BM; continue on current bowel regimen, monitor      DVT Prophylaxis: Ambulate every shift  Code Status: Full Code    Expected discharge:  >2 days safe disposition and  signed committment.    Sreekanth Foote MD  Text Page (7am - 6pm, M-F)      Interval History   Patient more awake, ambulatory with supervision of aides, can express wants/needs, toileting etc.  Nursing/sitter notes no acute behavioral issues.  No excess sedation on PTA scheduled Haldol.  Had BM on advance bowel program, above.  Still poor intake, needs encouragement.     SH: No tobacco. Lives in , as above daughter does not  want patient to return to [facility]"" Group home.    ROS: Complete ROS negative except as above.     -Data reviewed today: I reviewed all new labs and imaging results over the last 24 hours.  Physical Exam   Temp: 97.4  F (36.3  C) Temp src: Oral BP: 127/72 Pulse: 73   Resp: 18 SpO2: 96 % O2 Device: None (Room air)    Vitals:    12/05/22 1300   Weight: 86.3 kg (190 lb 4.1 oz)     General/Constitutional:  NAD, awake, calm, cooperative; answers to questions with 1-2 words, appropriate, soft-spoken..  HEENT/Head Exam:  atraumatic  Eyes:  PERRL, no conjunctivits  Mouth/Oral Pharynx:  Buccal mucosa WNL  Chest/Respiratory: Respiration nonlabored on RA.  Cardiovascular:  no murmur appreciated.  LE edema none  Gastrointestinal/Abdomen:  soft, nontender,  Neurologic:  gross motor testing nonfocal.    Observed ambulation on unit, standby assist.  Psychiatric:  Mental status: Awake; orientation not tested. affect calm.    Medications       amLODIPine  5 mg Oral Daily     haloperidol  5 mg Oral BID     polyethylene glycol  17 g Oral Daily     sodium chloride (PF)  3 mL Intracatheter Q8H     Vitamin D3  2,000 Units Oral Daily       Data   Recent Labs   Lab 12/08/22  0810 12/07/22  0852 12/06/22  0851 12/06/22  0812 12/06/22  0721 12/05/22  2330 12/05/22  1015   WBC  --  5.1  --   --   --   --  11.9*   HGB  --  13.8  --   --   --   --  14.5   MCV  --  89  --   --   --   --  87   PLT  --  149*  --   --   --   --  166    141  --   --  141  --  143   POTASSIUM 3.9 3.5  --   --  4.0  --  4.0   CHLORIDE 113* 113*  --   --  114*  --  107   CO2 24 23  --   --  25  --  25   BUN 10 13  --   --  18  --  24   CR 0.79 0.81  --   --  0.81  --  1.04   ANIONGAP 5 5  --   --  2*  --  11   ALCIRA 8.2* 8.2*  --   --  8.4*  --  9.6   * 92 123*   < > 76   < > 74   ALBUMIN  --   --   --   --   --   --  3.9   PROTTOTAL  --   --   --   --   --   --  8.3   BILITOTAL  --   --   --   --   --   --  1.4*   ALKPHOS  --   --   --   --   --   --  51   ALT   --   --   --   --   --   --  28   AST  --   --   --   --   --   --  39    < > = values in this interval not displayed.

## 2022-12-11 NOTE — PLAN OF CARE
"Goal Outcome Evaluation:  Summary: AMS, Cold exposure,Rhabdo.  [place], From Group [employer].   DATE & TIME:12/10 4937-1852  Cognitive Concerns/ Orientation : LIDIA, pt inconsistent with answering questions. Quiet, talks in a whisper. Alert. Flat affect. Cooperative and redirectable. Slept throughout the night   BEHAVIOR & AGGRESSION TOOL COLOR: Green  ABNL VS/O2: VSS on RA   MOBILITY: IND/ SBA d/t wandering  PAIN MANAGMENT: Denies, absence of nonverbal pain indicators   DIET: Regular , fair appetite  BOWEL/BLADDER: Continent  ABNL LAB/BG:   DRAIN/DEVICES: None  SKIN: LIDIA full skin assessment. Denies any abdominal pain, abdomen soft and non tender.   TESTS/PROCEDURES: --  D/C DAY/GOALS/PLACE: Provisional discharge to [facility] has been revoked. Pt will be held here until locked memory care can be found. SW and MI Commitment  following for placement.   OTHER IMPORTANT INFO: Unable to complete admission questions. Psych following. 72 hour hold . Per MD, \" to signed court ordered commitment withheld until discharge plan in place.\" Door alarm on, sitter sitting outside of patient's room. No aggressive behaviors noted this shift.                       "

## 2022-12-11 NOTE — PLAN OF CARE
"Goal Outcome Evaluation:      Plan of Care Reviewed With: patient    Overall Patient Progress: no changeOverall Patient Progress: no change     Summary: AMS, Cold exposure,Rhabdo.  [place], From [facility].   DATE & TIME:12/10-  Cognitive Concerns/ Orientation : LIDIA, pt inconsistent with answering questions. Quiet, talks in a whisper. Alert. Flat affect. Cooperative and redirectable.   BEHAVIOR & AGGRESSION TOOL COLOR: Green  ABNL VS/O2: Qshift VS. VSS on RA. Diastolic BP elevated- pt kept moving arm during reading. Pt then refused for another retake.   MOBILITY: IND/ SBA d/t wandering  PAIN MANAGMENT: Denies, absence of nonverbal pain indicators   DIET: Regular, fair appetite  BOWEL/BLADDER: Continent  ABNL LAB/BG:   DRAIN/DEVICES: None  SKIN: LIDIA full skin assessment. Denies any abdominal pain, abdomen soft and non tender.   TESTS/PROCEDURES: --  D/C DAY/GOALS/PLACE: Provisional discharge to [facility] has been revoked. Pt will be held here until locked memory care can be found. SW and MI Commitment  following for placement.   OTHER IMPORTANT INFO: Unable to complete admission questions. Psych following. 72 hour hold . Per MD, \" to signed court ordered commitment withheld until discharge plan in place.\" Door alarm on, sitter sitting outside of patient's room. No aggressive behaviors noted this shift.       "

## 2022-12-12 PROCEDURE — 99231 SBSQ HOSP IP/OBS SF/LOW 25: CPT | Performed by: HOSPITALIST

## 2022-12-12 PROCEDURE — 120N000001 HC R&B MED SURG/OB

## 2022-12-12 PROCEDURE — 250N000013 HC RX MED GY IP 250 OP 250 PS 637: Performed by: HOSPITALIST

## 2022-12-12 RX ADMIN — Medication 2000 UNITS: at 09:57

## 2022-12-12 RX ADMIN — HALOPERIDOL 5 MG: 5 TABLET ORAL at 09:57

## 2022-12-12 RX ADMIN — POLYETHYLENE GLYCOL 3350 17 G: 17 POWDER, FOR SOLUTION ORAL at 09:57

## 2022-12-12 RX ADMIN — AMLODIPINE BESYLATE 5 MG: 5 TABLET ORAL at 09:57

## 2022-12-12 RX ADMIN — HALOPERIDOL 5 MG: 5 TABLET ORAL at 21:34

## 2022-12-12 ASSESSMENT — ACTIVITIES OF DAILY LIVING (ADL)
ADLS_ACUITY_SCORE: 41

## 2022-12-12 NOTE — PROGRESS NOTES
"[facility]    Medicine Progress Note - Hospitalist Service    Date of Admission:  12/5/2022    Assessment & Plan   Evans Zimmer is a 57 year old male admitted on 12/5/2022.  resident of Group Home, presented with altered mental status, cold exposure per EMS.       History major neurocognitive disorder, no history elicited from patient  Behavioral issue, wandering from Group Home  Cold exposure x 24 hours; mild rhabdo; resolved  * No history from patient, primarily nonverbal, per EMS lives in group home.   * wandered from group home and outside about 24 hours, see H&P for details  * last hospitalization [facility] in August 2022 [8/-/11/25/22] essentially presenting the same, nonsensical, loose association, seen by Psychiatry with diagnosis of \"major neurocognitive disorder\" notation of antisocial behavior, history of opiate use disorder, previously on methadone maintenance.   On that hospitalization there was notation of Padilla/commitment; patient discharged to group home.  * In ED no signs of cold exposure, core temperature normal after warming blankets however CK elevated at 1215; improved with IVF and no longer monitoring  * At this time mentation appears at baseline;  patient could not comply to head CT without significant sedation; monitor    -Continue hold status per ED  - scheduled toileting as it appears patient cannot communicate wants/needs.  Ambulation/walks around unit with gait belt and staff as patient desires and staff available to prevent agitation.  - continue PTA Haldol 5 mg twice daily  - see progress note 12/11 regarding dispo; his  is currently pursuing a locked facility as they and family do not want him to return to prior group home given his elopement (this is supported by  kassie)     History hepatitis C, historical  -Noted     History hearing impairment  Patient responsive to questioning without obvious hearing impairment at conversational " level.  Follow-up auditory acuity per PCP.     Constipation  - scheduled MiraLAX daily   - prn senna, MOM         Diet: Combination Diet Regular Diet; Thin Liquids (level 0); Safe Tray - with utensils    DVT Prophylaxis: Low Risk/Ambulatory with no VTE prophylaxis indicated  Valenzuela Catheter: Not present  Central Lines: None  Cardiac Monitoring: None  Code Status: Full Code      Disposition Plan      Expected Discharge Date: 12/14/2022, 12:00 PM      Discharge Comments: Pts CM making referrals to locked  unit        The patient's care was discussed with the Bedside Nurse and Patient.    Hammad Quach MD  Hospitalist Service  [facility]  Securely message with the Vocera Web Console (learn more here)  Text page via [employer] Paging/Directory         Clinically Significant Risk Factors                                ______________________________________________________________________    Interval History   He offers no complaints today.  States he will let me know if there are any problems.     Data reviewed today: I reviewed all medications, new labs and imaging results over the last 24 hours. I personally reviewed no images or EKG's today.    Physical Exam   Vital Signs: Temp: 98.6  F (37  C) Temp src: Oral BP: 111/79 Pulse: 74   Resp: 16 SpO2: 99 % O2 Device: None (Room air)    Weight: 190 lbs 4.11 oz  General Appearance: Well nourished male in lying in bed in [facility]  Respiratory: respirations non-labored on room air  Cardiovascular:   GI:   Skin:   Other: Awake and alert     Data   Recent Labs   Lab 12/08/22  0810 12/07/22  0852 12/06/22  0851 12/06/22  0812 12/06/22  0721   WBC  --  5.1  --   --   --    HGB  --  13.8  --   --   --    MCV  --  89  --   --   --    PLT  --  149*  --   --   --     141  --   --  141   POTASSIUM 3.9 3.5  --   --  4.0   CHLORIDE 113* 113*  --   --  114*   CO2 24 23  --   --  25   BUN 10 13  --   --  18   CR 0.79 0.81  --   --  0.81   ANIONGAP 5 5  --   --  2*    ALCIRA 8.2* 8.2*  --   --  8.4*   * 92 123*   < > 76    < > = values in this interval not displayed.

## 2022-12-12 NOTE — PLAN OF CARE
Goal Outcome Evaluation:      Plan of Care Reviewed With: patient    Overall Patient Progress: no changeOverall Patient Progress: no change     Summary: AMS, Cold exposure,Rhabdo.  [place], From Group home.   DATE & TIME:12/11-12/12 1930-0730  Cognitive Concerns/ Orientation : LIDIA. pt inconsistent with answering questions. Quiet, talks in a whisper. Alert. Flat affect. Cooperative and redirectable.   BEHAVIOR & AGGRESSION TOOL COLOR: Green  ABNL VS/O2: Qshift VS. VSS on RA. Started on amlodipine   MOBILITY: IND/ SBA d/t wandering  PAIN MANAGMENT: Denies, absence of nonverbal pain indicators   DIET: Regular, fair appetite  BOWEL/BLADDER: Continent  ABNL LAB/BG:   DRAIN/DEVICES: None  SKIN: LIDIA full skin assessment. Denies any abdominal pain, abdomen soft and non tender.   TESTS/PROCEDURES: --  D/C DAY/GOALS/PLACE:  Pt will be held here until locked [facility] care can be found. SW and MI Commitment  following for placement.   OTHER IMPORTANT INFO: Unable to complete admission questions. Psych following. Door alarm on, pt was a long arm overnight, No aggressive behaviors noted this shift.

## 2022-12-12 NOTE — PROGRESS NOTES
Goal Outcome Evaluation:       Plan of Care Reviewed With: patient     Overall Patient Progress: no changeOverall Patient Progress: no change      Summary: AMS, Cold exposure,Rhabdo.  [place], From Group home.   DATE & TIME:12/12-22    7860-9879  Cognitive Concerns/ Orientation : LIDIA. pt inconsistent with answering questions. Quiet, talks in a whisper. Alert. Flat affect. Cooperative and redirectable.   BEHAVIOR & AGGRESSION TOOL COLOR: Green  ABNL VS/O2: Qshift VS. VSS on RA. Started on amlodipine   MOBILITY: IND/ SBA d/t wandering  PAIN MANAGMENT: Denies, absence of nonverbal pain indicators   DIET: Regular, fair appetite  BOWEL/BLADDER: Continent  ABNL LAB/BG:   DRAIN/DEVICES: None  SKIN: LIDIA full skin assessment. Denies any abdominal pain, abdomen soft and non tender.   TESTS/PROCEDURES: --  D/C DAY/GOALS/PLACE:  Pt will be held here until locked memory care can be found. SW and MI Commitment  following for placement.   OTHER IMPORTANT INFO: Unable to complete admission questions. Psych following. Door alarm on, pt was a long arm overnight, No aggressive behaviors noted this shift.

## 2022-12-13 PROCEDURE — 250N000013 HC RX MED GY IP 250 OP 250 PS 637: Performed by: HOSPITALIST

## 2022-12-13 PROCEDURE — 99231 SBSQ HOSP IP/OBS SF/LOW 25: CPT | Performed by: HOSPITALIST

## 2022-12-13 PROCEDURE — 120N000001 HC R&B MED SURG/OB

## 2022-12-13 RX ADMIN — Medication 2000 UNITS: at 09:36

## 2022-12-13 RX ADMIN — HALOPERIDOL 5 MG: 5 TABLET ORAL at 20:53

## 2022-12-13 RX ADMIN — AMLODIPINE BESYLATE 5 MG: 5 TABLET ORAL at 09:36

## 2022-12-13 RX ADMIN — HALOPERIDOL 5 MG: 5 TABLET ORAL at 09:36

## 2022-12-13 RX ADMIN — POLYETHYLENE GLYCOL 3350 17 G: 17 POWDER, FOR SOLUTION ORAL at 09:35

## 2022-12-13 ASSESSMENT — ACTIVITIES OF DAILY LIVING (ADL)
ADLS_ACUITY_SCORE: 41

## 2022-12-13 NOTE — PROGRESS NOTES
"[facility]    Medicine Progress Note - Hospitalist Service    Date of Admission:  12/5/2022    Assessment & Plan   Evans Zimmer is a 57 year old male admitted on 12/5/2022.  Hx neurocognitive disorder, resident of Group Home, presented with altered mental status, cold exposure following elopement from group home.  He is currently medically stable for discharge, awaiting dispo.        History major neurocognitive disorder, no history elicited from patient  Behavioral issue, wandering from Group Home  Cold exposure x 24 hours; mild rhabdo; resolved  * No history from patient, primarily nonverbal, per EMS lives in group home.   * wandered from group home and outside about 24 hours, see H&P for details  * last hospitalization [facility] in August 2022 [8/-/11/25/22] essentially presenting the same, nonsensical, loose association, seen by Psychiatry with diagnosis of \"major neurocognitive disorder\" notation of antisocial behavior, history of opiate use disorder, previously on methadone maintenance.   On that hospitalization there was notation of Padilla/commitment; patient discharged to group home.  * In ED no signs of cold exposure, core temperature normal after warming blankets however CK elevated at 1215; improved with IVF and no longer monitoring  * mentation appears at baseline;  patient could not comply with head CT without significant sedation so was not pursued    - scheduled toileting as it appears patient cannot communicate wants/needs.  Ambulation/walks around unit with gait belt and staff as patient desires   - continue PTA Haldol 5 mg twice daily  - continue hold status, see progress note 12/11 regarding dispo; his  is currently pursuing a locked facility as they and family do not want him to return to prior group home given his elopement (this is supported by judge fernando)     History hepatitis C, historical  -Noted     History hearing impairment  Patient responsive " to questioning without obvious hearing impairment at conversational level.  Follow-up auditory acuity per PCP.     Constipation  - scheduled MiraLAX daily   - prn senna, MOM         Diet: Combination Diet Regular Diet; Thin Liquids (level 0); Safe Tray - with utensils    DVT Prophylaxis: Low Risk/Ambulatory with no VTE prophylaxis indicated  Valenzuela Catheter: Not present  Central Lines: None  Cardiac Monitoring: None  Code Status: Full Code      Disposition Plan     Expected Discharge Date: 12/14/2022, 12:00 PM      Discharge Comments: Pts CM making referrals to locked  unit        The patient's care was discussed with the Bedside Nurse and Patient.    Hammad Quach MD  Hospitalist Service  [facility]  Securely message with the Vocera Web Console (learn more here)  Text page via [employer] Paging/Directory         Clinically Significant Risk Factors                                ______________________________________________________________________    Interval History   Denies any concerns today, is feeling well.     Data reviewed today: I reviewed all medications, new labs and imaging results over the last 24 hours. I personally reviewed no images or EKG's today.    Physical Exam   Vital Signs: Temp: 97.6  F (36.4  C) Temp src: Oral BP: 132/83 Pulse: 75   Resp: 16 SpO2: 98 % O2 Device: None (Room air)    Weight: 190 lbs 4.11 oz     General Appearance: Well nourished male sitting in chair in hallway  Respiratory: respirations non-labored on room air  Cardiovascular:   GI:   Skin:   Other: Awake and alert    Data   Recent Labs   Lab 12/08/22  0810 12/07/22  0852   WBC  --  5.1   HGB  --  13.8   MCV  --  89   PLT  --  149*    141   POTASSIUM 3.9 3.5   CHLORIDE 113* 113*   CO2 24 23   BUN 10 13   CR 0.79 0.81   ANIONGAP 5 5   ALCIRA 8.2* 8.2*   * 92

## 2022-12-13 NOTE — PLAN OF CARE
Goal Outcome Evaluation:    Summary: AMS, Cold exposure,Rhabdo.  [place], From Group home.   DATE & TIME:12/12 7911-3419  Cognitive Concerns/ Orientation : LIDIA. pt inconsistent with communication Quiet, talks in a whisper. Alert. Flat affect. Cooperative and redirectable.   BEHAVIOR & AGGRESSION TOOL COLOR: Green  ABNL VS/O2: Qshift VS - VSS on RA.   MOBILITY: IND/ SBA d/t wandering  PAIN MANAGMENT: Denies pain, absence of nonverbal pain indicators not present  DIET: Regular, fair appetite  BOWEL/BLADDER: Continent  ABNL LAB/BG:   DRAIN/DEVICES: None  SKIN: LIDIA full skin assessment - pt refused  TESTS/PROCEDURES: --  D/C DAY/GOALS/PLACE:  Pt will be held here until locked memory care can be found. SW and MI Commitment  following for placement.   OTHER IMPORTANT INFO:  Psych following. Door alarm on,

## 2022-12-13 NOTE — PROGRESS NOTES
Care Management Follow Up    Length of Stay (days): 8    Expected Discharge Date: 12/21/2022     Concerns to be Addressed:       Patient plan of care discussed at interdisciplinary rounds: Yes    Anticipated Discharge Disposition: Return to [facility] if MD approves   Anticipated Discharge Services:    Anticipated Discharge DME:      Patient/family educated on Medicare website which has current facility and service quality ratings:    Education Provided on the Discharge Plan:    Patient/Family in Agreement with the Plan:      Referrals Placed by CM/SW:    Private pay costs discussed: Not applicable    Additional Information:  Received an update from [employer] , [facility]  [phone].  She reports a barrier to placing patient into a secured care facility is lack of appointed guardian.  She has spoken in the past with patient's daughter who lives in [place] however the daughter cannot afford the  cost.   Vika has spoken with Pradip, manager of [facility].  He reports they have another patient who uses the Wander Guard system.  He is confident he can keep Evans from roaming outside alone with the use of the Wander Guard. His plan is he would place the Wander Guard bracelet on patient immediately upon his return.  The alarm system would go off if patient gets within a certain distance of the door.   Vika is concerned with the [facility] ability to keep patient from wandering away again but she also doesn't have any other options.  She is approving patient returning to [facility] if the hospitalist is in agreement.  Plan: Will discuss with hospitalist.      GARLAND Davila

## 2022-12-13 NOTE — PROGRESS NOTES
Care Management Follow Up    Length of Stay (days): 8    Expected Discharge Date: 12/14/2022     Concerns to be Addressed:       Patient plan of care discussed at interdisciplinary rounds: Yes    Anticipated Discharge Disposition: Home     Anticipated Discharge Services:    Anticipated Discharge DME:      Patient/family educated on Medicare website which has current facility and service quality ratings:    Education Provided on the Discharge Plan:    Patient/Family in Agreement with the Plan:      Referrals Placed by CM/SW:    Private pay costs discussed: Not applicable    Additional Information:  Placed a call to Patient's [employer]  under his MI Commitment, Vika Ramirez at [phone] late afternoon on Friday, 12/9.  Writer did not receive a call back on Monday.   Left her another  Message this morning. asking if she has made any progress in finding patient a secured care center and to offer assistance in her relocation effort of patient.     Patient remains under a court hold and will be here till a safe discharge plan is in place. Ms Ramirez communicates with daughter on a regular basis.          Audrey Harvey, WALESW

## 2022-12-13 NOTE — PLAN OF CARE
Goal Outcome Evaluation:         Summary: AMS, Cold exposure, Rhabdo.  [place], From Group home.   DATE & TIME:12/12/22- 12/13/22 0832-1788  Cognitive Concerns/ Orientation : LIDIA. pt inconsistent with communication Quiet, talks in a whisper. Alert. Flat affect. Cooperative and redirectable.   BEHAVIOR & AGGRESSION TOOL COLOR: Green  ABNL VS/O2: Qshift VS - VSS on RA.   MOBILITY: IND/ SBA d/t wandering  PAIN MANAGMENT: Denies pain, absence of nonverbal pain indicators not present  DIET: Regular, fair appetite  BOWEL/BLADDER: Continent  ABNL LAB/BG:   DRAIN/DEVICES: None  SKIN: LIDIA full skin assessment - pt refused  TESTS/PROCEDURES: - none this shift   D/C DAY/GOALS/PLACE:  Pt will be held here until locked memory care can be found. SW and MI Commitment  following for placement.   OTHER IMPORTANT INFO:  Psych following. Door alarm on, sitter at side

## 2022-12-14 VITALS
OXYGEN SATURATION: 98 % | HEART RATE: 74 BPM | DIASTOLIC BLOOD PRESSURE: 93 MMHG | SYSTOLIC BLOOD PRESSURE: 145 MMHG | TEMPERATURE: 97.8 F | HEIGHT: 75 IN | BODY MASS INDEX: 23.66 KG/M2 | WEIGHT: 190.26 LBS | RESPIRATION RATE: 18 BRPM

## 2022-12-14 PROCEDURE — 250N000013 HC RX MED GY IP 250 OP 250 PS 637

## 2022-12-14 PROCEDURE — 99238 HOSP IP/OBS DSCHRG MGMT 30/<: CPT | Performed by: HOSPITALIST

## 2022-12-14 PROCEDURE — 250N000013 HC RX MED GY IP 250 OP 250 PS 637: Performed by: HOSPITALIST

## 2022-12-14 RX ORDER — AMLODIPINE BESYLATE 5 MG/1
5 TABLET ORAL DAILY
Qty: 30 TABLET | Refills: 1 | Status: SHIPPED | OUTPATIENT
Start: 2022-12-15

## 2022-12-14 RX ADMIN — Medication 2000 UNITS: at 09:11

## 2022-12-14 RX ADMIN — HALOPERIDOL 5 MG: 5 TABLET ORAL at 09:11

## 2022-12-14 RX ADMIN — POLYETHYLENE GLYCOL 3350 17 G: 17 POWDER, FOR SOLUTION ORAL at 09:11

## 2022-12-14 RX ADMIN — OLANZAPINE 5 MG: 5 TABLET, ORALLY DISINTEGRATING ORAL at 17:22

## 2022-12-14 RX ADMIN — AMLODIPINE BESYLATE 5 MG: 5 TABLET ORAL at 09:11

## 2022-12-14 ASSESSMENT — ACTIVITIES OF DAILY LIVING (ADL)
ADLS_ACUITY_SCORE: 41

## 2022-12-14 NOTE — PLAN OF CARE
Goal Outcome Evaluation:  Summary: AMS, Cold exposure, Rhabdo.  [place], From Group home.   DATE & TIME:12/13/22 0733-0103  Cognitive Concerns/ Orientation : LIDIA. pt inconsistent with communication Quiet, talks in a whisper. Alert. Flat affect. Cooperative and redirectable.   BEHAVIOR & AGGRESSION TOOL COLOR: Green  ABNL VS/O2: VSS on RA.   MOBILITY: IND/ SBA d/t wandering  PAIN MANAGMENT: Denies pain, absence of nonverbal pain indicators not present  DIET: Regular, fair appetite  BOWEL/BLADDER: Continent  ABNL LAB/BG: NA  DRAIN/DEVICES: None  SKIN: LIDIA full skin assessment - pt refused  TESTS/PROCEDURES: - none this shift   D/C DAY/GOALS/PLACE:  unable to arrange a locked unit due to legal reasons. Pt will return to Pradip [facility] with wonder guard if MD is okay.   OTHER IMPORTANT INFO:  Psych following. Door alarm on, sitter at side

## 2022-12-14 NOTE — CODE/RAPID RESPONSE
Code 21  12/14/2022  1430    Mr. Zimmer was scheduled for discharge back to his group home this afternoon via EMS transport.  The patient initially was amenable to getting on the cart for transport, but became agitated when he was going to be strapped down.  EMS determined the patient needed a higher level of transport care with ALS units due to his violent tendencies.  The patient was off the cart, able to walk around, cool off and return to his baseline behavior.    Discussed with nursing staff, plan is to reschedule transport.  The patient has a Zyprexa 5 mg ODT as needed, this will be a good option to help ease his transport distress.    Patient did not require any additional medications or restraints while the rapid response team was present.      LELO Gloria [facility]  Hospitalist Service  House Officer  Pager: [phone] (7a - 6p)

## 2022-12-14 NOTE — PROGRESS NOTES
Care Management Discharge Note    Discharge Date: 12/14/2022       Discharge Disposition: Home    Discharge Services:      Discharge DME:      Discharge Transportation:      Private pay costs discussed: Not applicable    PAS Confirmation Code:  n/a  Patient/family educated on Medicare website which has current facility and service quality ratings:  n/a    Education Provided on the Discharge Plan:    Persons Notified of Discharge Plans: [employer]  Vika Ramirez [phone]  Patient/Family in Agreement with the Plan:      Handoff Referral Completed: No    Additional Information:  Patient will discharge back today to [facility] at 1415. Transport will be BLS due to patient's history of aggressive behavior and because his return to [facility] is a Provisional Discharge under his MI commitment. [facility] is located at [address] [place] [address] [phone].   Ms Hollins KALI,  is requesting MD write on the discharge orders that patient needs to have Wander Guard placed immediately upon his arrival to [facility].  Writer spoke with the manager Pradip regarding above information.  He is prepared to accept patient today and place Wander Guard on Evans upon his return. [facility] is [phone].  Orders, H&P and Provisional Discharge faxed to Pradip at the [facility] to [phone].   PCS and face sheet faxed to [facility].  Provisional Discharge faxed to Ms Ramirez at [phone].        Audrey Harvey [facility] HospitalSW

## 2022-12-14 NOTE — PLAN OF CARE
Goal Outcome Evaluation:  Summary: AMS, Cold exposure, Rhabdo.  [place], From Group home.   DATE & TIME:12/14/22 9477-2187  Cognitive Concerns/ Orientation : LIDIA. pt inconsistent with communication Quiet, talks in a whisper. Alert. Flat affect. Aggressive with EMS.   BEHAVIOR & AGGRESSION TOOL COLOR: Green/Yellow  ABNL VS/O2: VSS on RA.   MOBILITY: IND/ SBA d/t wandering  PAIN MANAGMENT: Denies pain, absence of nonverbal pain indicators not present  DIET: Regular, fair appetite  BOWEL/BLADDER: Continent  ABNL LAB/BG: NA  DRAIN/DEVICES: None  SKIN: LIDIA full skin assessment - pt refused  TESTS/PROCEDURES: - none this shift   D/C DAY/GOALS/PLACE:  unable to arrange a locked unit due to legal reasons. Pt will return to [facility] with agata kelly MD is okay.   OTHER IMPORTANT INFO:  Psych following. Door alarm on, sitter at side. Pt got aggressive when writer tired to get him on the strecher for transport. Haldol to be given half an hour before transportation.        Code 21 called at 1430, because pt got combative and refused to lay on the stretcher. Another transportation awaiting with restrains and sedation capabilities.

## 2022-12-14 NOTE — DISCHARGE SUMMARY
"[facility]  Hospitalist Discharge Summary      Date of Admission:  12/5/2022  Date of Discharge:  12/14/2022  Discharging Provider: Hammad Quach MD  Discharge Service: Hospitalist Service    Discharge Diagnoses   Hx major neurocognitive disorder  Elopement from group home   Mild rhabdomyolysis due to cold exposure  Hx Hep C  Hearing impairment ruled out  Constipation    Follow-ups Needed After Discharge   Follow-up Appointments     Follow-up and recommended labs and tests       Follow up with primary care provider within 7 days for hospital follow-   up.  No follow up labs or test are needed.               Discharge Disposition   Discharged to group home  Condition at discharge: Stable    Hospital Course   Evans Zimmer is a 57 year old male admitted on 12/5/2022.  Hx neurocognitive disorder, resident of Group Home, presented with altered mental status, cold exposure following elopement from group home.          History major neurocognitive disorder  Elopement from group home  Mild rhabdomyolysis due to cold exposure  No history from patient, primarily nonverbal, per EMS lives in group home.   Wandered from group home and outside for about 24 hours overnight, see H&P for details.  Last hospitalization [facility] in August 2022 [8/-/11/25/22] essentially presenting the same, nonsensical, loose association, seen by Psychiatry with diagnosis of \"major neurocognitive disorder\" notation of antisocial behavior, history of opiate use disorder, previously on methadone maintenance.   On that hospitalization there was notation of [facility]/commitment; patient discharged to group home.  At arrival, no signs of cold exposure, core temperature normal after warming blankets, however CK elevated at 1215; improved with IVF and no longer monitoring.  His mentation appeared at baseline; patient could not comply with head CT without significant sedation so this was not pursued.  Discharge back to group home " initially held due to concern for recurrent elopement, but his  was unable to find a locked unit to which he could discharge to.  Ultimately, his  felt that they would be able to adequately monitor him with a WanderGuard and will therefore discharge back to group home and have this placed on arrival.  Would recommend Wander Guard be placed immediately upon arrival with staff to ensure it remains intact every evening before bed.     HTN  Frequently hypertensive this admission, initiated on amlodipine 5 mg daily with good response.  Follow up with PCP.      History hepatitis C, historical  Noted     History hearing impairment  Patient responsive to questioning without obvious hearing impairment at conversational level.  Follow-up auditory acuity per PCP.     Constipation  Continue PTA bowel regimen.         Consultations This Hospital Stay   CARE MANAGEMENT / SOCIAL WORK IP CONSULT  PSYCHIATRY IP CONSULT  VASCULAR ACCESS ADULT IP CONSULT  VASCULAR ACCESS ADULT IP CONSULT    Code Status   Full Code    Time Spent on this Encounter   I, Hammad Quach MD, personally saw the patient today and spent less than or equal to 30 minutes discharging this patient.       Hammad Quach MD  [place] [address] [facility]  [address] [place] [address]  Phone: [phone]  ______________________________________________________________________    Physical Exam   Vital Signs: Temp: 97.4  F (36.3  C) Temp src: Oral BP: 122/82 Pulse: 80   Resp: 18 SpO2: 97 % O2 Device: None (Room air)    Weight: 190 lbs 4.11 oz  General Appearance: Well nourished male in NAD  Respiratory: lungs CTAB, no wheezes or crackles  Cardiovascular: refused cardiac exam  GI: refused abdominal exam  Other: Awake and alert, cranial nerves grossly intact, soft-spoken, ambulating independently        Primary Care Physician   Physician No Ref-Primary    Discharge Orders      Reason for your hospital stay     You were admitted for cold exposure after eloping from group [facility].     Follow-up and recommended labs and tests     Follow up with primary care provider within 7 days for hospital follow- up.  No follow up labs or test are needed.     Activity    Your activity upon discharge: activity as tolerated     Discharge Instructions    Place Wander Guard immediately upon arrival to [facility].  Have group [facility] staff verify presence every evening before bed.     Diet    Follow this diet upon discharge:       Combination Diet Regular Diet; Thin Liquids (level 0); Safe Tray - with utensils       Significant Results and Procedures   Most Recent 3 CBC's:Recent Labs   Lab Test 12/07/22  0852 12/05/22  1015   WBC 5.1 11.9*   HGB 13.8 14.5   MCV 89 87   * 166     Most Recent 3 BMP's:Recent Labs   Lab Test 12/08/22  0810 12/07/22  0852 12/06/22  0851 12/06/22  0812 12/06/22  0721    141  --   --  141   POTASSIUM 3.9 3.5  --   --  4.0   CHLORIDE 113* 113*  --   --  114*   CO2 24 23  --   --  25   BUN 10 13  --   --  18   CR 0.79 0.81  --   --  0.81   ANIONGAP 5 5  --   --  2*   ALCIRA 8.2* 8.2*  --   --  8.4*   * 92 123*   < > 76    < > = values in this interval not displayed.     Most Recent 2 LFT's:Recent Labs   Lab Test 12/05/22  1015   AST 39   ALT 28   ALKPHOS 51   BILITOTAL 1.4*   , No results found for this or any previous visit.    Discharge Medications   Current Discharge Medication List      START taking these medications    Details   amLODIPine (NORVASC) 5 MG tablet Take 1 tablet (5 mg) by mouth daily  Qty: 30 tablet, Refills: 1    Associated Diagnoses: Benign essential hypertension         CONTINUE these medications which have NOT CHANGED    Details   cholecalciferol 25 MCG (1000 UT) TABS Take 2,000 Units by mouth daily      !! haloperidol (HALDOL) 2 MG tablet Take 2 mg by mouth every 6 hours as needed for agitation      !! haloperidol (HALDOL) 5 MG tablet Take 5 mg by mouth 2 times daily      melatonin 3  MG tablet Take 3 mg by mouth At Bedtime      polyethylene glycol (MIRALAX) 17 GM/Dose powder Take 17 g by mouth daily       !! - Potential duplicate medications found. Please discuss with provider.        Allergies   No Known Allergies

## 2022-12-14 NOTE — PLAN OF CARE
Summary:Pt eloped from his group home and was later found outside with cold exposure.   DATE & TIME: 12/13-14 [id number]  Cognitive Concerns/ Orientation : Flat affect, pt non-responsive for most of conversations; whispers when he does speak.   BEHAVIOR & AGGRESSION TOOL COLOR: Green  ABNL VS/O2: VSS on RA.   MOBILITY: IND/ SBA d/t wandering  PAIN MANAGMENT: Denies pain, does not appear to be in distress.   DIET: Regular, fair appetite, ate half of dinner  BOWEL/BLADDER: Continent  ABNL LAB/BG: CK total, rhabdo  DRAIN/DEVICES: None  SKIN: LIDIA full skin assessment - pt refused  TESTS/PROCEDURES: - none this shift   D/C DAY/GOALS/PLACE: Pt will return to [facility], with Wander Guard system to help staff with elopement, if MD agrees.   OTHER IMPORTANT INFO:  Psych following. Door alarm on, long arm

## 2022-12-15 NOTE — PLAN OF CARE
Discharge Note    Patient discharged to  group home  via EMS/BLS accompanied by other transport services .  IV: Discontinued  Prescriptions filled and given to patient/family.   Belongings reviewed and sent with patient.   Home medications returned to patient: NA  Equipment sent with: patient.   patient verbalizes understanding of discharge instructions. AVS given to patient.

## 2023-10-01 ENCOUNTER — HOSPITAL ENCOUNTER (EMERGENCY)
Facility: CLINIC | Age: 58
Discharge: HOME | End: 2023-10-01
Attending: EMERGENCY MEDICINE | Admitting: EMERGENCY MEDICINE
Payer: MEDICARE

## 2023-10-01 VITALS
SYSTOLIC BLOOD PRESSURE: 135 MMHG | HEART RATE: 88 BPM | RESPIRATION RATE: 19 BRPM | TEMPERATURE: 99.2 F | DIASTOLIC BLOOD PRESSURE: 89 MMHG | OXYGEN SATURATION: 100 %

## 2023-10-01 DIAGNOSIS — K13.0 INFECTION OF LIP: ICD-10-CM

## 2023-10-01 PROCEDURE — 99283 EMERGENCY DEPT VISIT LOW MDM: CPT

## 2023-10-01 PROCEDURE — 250N000013 HC RX MED GY IP 250 OP 250 PS 637: Performed by: EMERGENCY MEDICINE

## 2023-10-01 RX ADMIN — AMOXICILLIN AND CLAVULANATE POTASSIUM 1 TABLET: 875; 125 TABLET, FILM COATED ORAL at 19:16

## 2023-10-01 ASSESSMENT — ACTIVITIES OF DAILY LIVING (ADL)
ADLS_ACUITY_SCORE: 35
ADLS_ACUITY_SCORE: 35

## 2023-10-01 NOTE — ED NOTES
Group home phone number: [phone]    [facility]- - [phone]    ETA for ambulance to discharge is 21:00-21:30. [facility] updated on delay in transport.
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
## 2023-10-01 NOTE — ED NOTES
Bed: ST  Expected date: 10/1/23  Expected time: 5:58 PM  Means of arrival: Ambulance  Comments:  544 57m stroke

## 2023-10-01 NOTE — ED TRIAGE NOTES
Patient arrived via EMS with reports of left lower lip swelling. Concerns for Stroke although patient arrived to Stab room 3 ambulating. Patient declines to sit on hospital bed. Patient was drooling at group room.

## 2023-10-02 NOTE — DISCHARGE INSTRUCTIONS
Take the Augmentin twice a day for 10 days, avoid spicy foods, Tylenol or Motrin as needed.  Recommend a follow-up in the next week at a dentist office.  If he gets worse with significant swelling and he develops fevers, he will need to return to the emergency room at [facility].

## 2023-10-02 NOTE — ED NOTES
Brought from stabe due to oral abscess,noted with accumulated saliva inside his mouth ,not distress,refused to sit on the bed,paced once in a while,but redirectable,sitter is available,awaits transpo going back to group home.      Offered snacks and able to tolerate.

## 2025-09-03 ENCOUNTER — HOSPITAL ENCOUNTER (INPATIENT)
Facility: CLINIC | Age: 60
End: 2025-09-03
Attending: EMERGENCY MEDICINE | Admitting: SURGERY
Payer: MEDICARE

## 2025-09-03 DIAGNOSIS — R65.21 SEPTIC SHOCK (H): Primary | ICD-10-CM

## 2025-09-03 DIAGNOSIS — J96.01 ACUTE RESPIRATORY FAILURE WITH HYPOXIA (H): ICD-10-CM

## 2025-09-03 DIAGNOSIS — A41.9 SEPTIC SHOCK (H): Primary | ICD-10-CM

## 2025-09-03 LAB
ABO + RH BLD: NORMAL
ALBUMIN SERPL BCG-MCNC: 3.8 G/DL (ref 3.5–5.2)
ALBUMIN UR-MCNC: NEGATIVE MG/DL
ALP SERPL-CCNC: 76 U/L (ref 40–150)
ALT SERPL W P-5'-P-CCNC: 128 U/L (ref 0–70)
ANION GAP SERPL CALCULATED.3IONS-SCNC: 15 MMOL/L (ref 7–15)
APPEARANCE UR: CLEAR
AST SERPL W P-5'-P-CCNC: 68 U/L (ref 0–45)
ATRIAL RATE - MUSE: NORMAL BPM
BASE EXCESS BLDV CALC-SCNC: -4 MMOL/L (ref -3–3)
BASE EXCESS BLDV CALC-SCNC: -4.4 MMOL/L (ref -3–3)
BASOPHILS # BLD AUTO: <0.03 10E3/UL (ref 0–0.2)
BASOPHILS NFR BLD AUTO: 0.1 %
BILIRUB SERPL-MCNC: <0.2 MG/DL
BILIRUB UR QL STRIP: NEGATIVE
BLD GP AB SCN SERPL QL: NEGATIVE
BUN SERPL-MCNC: 25.9 MG/DL (ref 8–23)
CALCIUM SERPL-MCNC: 10.7 MG/DL (ref 8.8–10.4)
CHLORIDE SERPL-SCNC: 109 MMOL/L (ref 98–107)
CK SERPL-CCNC: 79 U/L (ref 39–308)
COLOR UR AUTO: ABNORMAL
CREAT SERPL-MCNC: 1.32 MG/DL (ref 0.67–1.17)
DIASTOLIC BLOOD PRESSURE - MUSE: NORMAL MMHG
EGFRCR SERPLBLD CKD-EPI 2021: 61 ML/MIN/1.73M2
EOSINOPHIL # BLD AUTO: <0.03 10E3/UL (ref 0–0.7)
EOSINOPHIL NFR BLD AUTO: 0 %
ERYTHROCYTE [DISTWIDTH] IN BLOOD BY AUTOMATED COUNT: 13.2 % (ref 10–15)
FLUAV RNA SPEC QL NAA+PROBE: NEGATIVE
FLUBV RNA RESP QL NAA+PROBE: NEGATIVE
GLUCOSE BLDC GLUCOMTR-MCNC: 100 MG/DL (ref 70–99)
GLUCOSE BLDC GLUCOMTR-MCNC: 109 MG/DL (ref 70–99)
GLUCOSE SERPL-MCNC: 171 MG/DL (ref 70–99)
GLUCOSE UR STRIP-MCNC: 200 MG/DL
HCO3 BLDV-SCNC: 24 MMOL/L (ref 21–28)
HCO3 BLDV-SCNC: 25 MMOL/L (ref 21–28)
HCO3 SERPL-SCNC: 21 MMOL/L (ref 22–29)
HCT VFR BLD AUTO: 43.3 % (ref 40–53)
HGB BLD-MCNC: 14.6 G/DL (ref 13.3–17.7)
HGB UR QL STRIP: NEGATIVE
HOLD SPECIMEN: NORMAL
IMM GRANULOCYTES # BLD: 0.03 10E3/UL
IMM GRANULOCYTES NFR BLD: 0.2 %
INR PPP: 1.03 (ref 0.85–1.15)
INTERPRETATION ECG - MUSE: NORMAL
KETONES UR STRIP-MCNC: NEGATIVE MG/DL
LACTATE BLD-SCNC: 4.1 MMOL/L (ref 0.7–2)
LACTATE SERPL-SCNC: 4.3 MMOL/L (ref 0.7–2)
LEUKOCYTE ESTERASE UR QL STRIP: NEGATIVE
LIPASE SERPL-CCNC: 1035 U/L (ref 13–60)
LYMPHOCYTES # BLD AUTO: 0.33 10E3/UL (ref 0.8–5.3)
LYMPHOCYTES NFR BLD AUTO: 2.5 %
MCH RBC QN AUTO: 29.1 PG (ref 26.5–33)
MCHC RBC AUTO-ENTMCNC: 33.7 G/DL (ref 31.5–36.5)
MCV RBC AUTO: 86.4 FL (ref 78–100)
MONOCYTES # BLD AUTO: 0.61 10E3/UL (ref 0–1.3)
MONOCYTES NFR BLD AUTO: 4.6 %
MUCOUS THREADS #/AREA URNS LPF: PRESENT /LPF
NEUTROPHILS # BLD AUTO: 12.14 10E3/UL (ref 1.6–8.3)
NEUTROPHILS NFR BLD AUTO: 92.6 %
NITRATE UR QL: NEGATIVE
NRBC # BLD AUTO: <0.03 10E3/UL
NRBC BLD AUTO-RTO: 0 /100
NT-PROBNP SERPL-MCNC: <36 PG/ML (ref 0–177)
O2/TOTAL GAS SETTING VFR VENT: 21 %
OXYHGB MFR BLDV: 73 % (ref 70–75)
P AXIS - MUSE: NORMAL DEGREES
PCO2 BLDV: 57 MM HG (ref 40–50)
PCO2 BLDV: 60 MM HG (ref 40–50)
PH BLDV: 7.23 [PH] (ref 7.32–7.43)
PH BLDV: 7.23 [PH] (ref 7.32–7.43)
PH UR STRIP: 6.5 [PH] (ref 5–7)
PLATELET # BLD AUTO: 130 10E3/UL (ref 150–450)
PO2 BLDV: 40 MM HG (ref 25–47)
PO2 BLDV: 46 MM HG (ref 25–47)
POTASSIUM SERPL-SCNC: 4 MMOL/L (ref 3.4–5.3)
PR INTERVAL - MUSE: NORMAL MS
PROT SERPL-MCNC: 7.3 G/DL (ref 6.4–8.3)
PROTHROMBIN TIME: 13.3 SECONDS (ref 11.8–14.8)
QRS DURATION - MUSE: 90 MS
QT - MUSE: 476 MS
QTC - MUSE: 491 MS
R AXIS - MUSE: 66 DEGREES
RBC # BLD AUTO: 5.01 10E6/UL (ref 4.4–5.9)
RBC URINE: 1 /HPF
RSV RNA SPEC NAA+PROBE: NEGATIVE
SAO2 % BLDV: 64 % (ref 70–75)
SAO2 % BLDV: 74.6 % (ref 70–75)
SARS-COV-2 RNA RESP QL NAA+PROBE: NEGATIVE
SODIUM SERPL-SCNC: 145 MMOL/L (ref 135–145)
SP GR UR STRIP: 1.02 (ref 1–1.03)
SPECIMEN EXP DATE BLD: NORMAL
SQUAMOUS EPITHELIAL: <1 /HPF
SYSTOLIC BLOOD PRESSURE - MUSE: NORMAL MMHG
T AXIS - MUSE: 69 DEGREES
TROPONIN T SERPL HS-MCNC: 13 NG/L
TROPONIN T SERPL HS-MCNC: 15 NG/L
TSH SERPL DL<=0.005 MIU/L-ACNC: 3.88 UIU/ML (ref 0.3–4.2)
UROBILINOGEN UR STRIP-MCNC: NORMAL MG/DL
VENTRICULAR RATE- MUSE: 64 BPM
WBC # BLD AUTO: 13.12 10E3/UL (ref 4–11)
WBC URINE: 1 /HPF

## 2025-09-03 PROCEDURE — 84484 ASSAY OF TROPONIN QUANT: CPT | Performed by: EMERGENCY MEDICINE

## 2025-09-03 PROCEDURE — 31500 INSERT EMERGENCY AIRWAY: CPT

## 2025-09-03 PROCEDURE — 250N000009 HC RX 250

## 2025-09-03 PROCEDURE — 99285 EMERGENCY DEPT VISIT HI MDM: CPT | Mod: 25 | Performed by: EMERGENCY MEDICINE

## 2025-09-03 PROCEDURE — 999N000157 HC STATISTIC RCP TIME EA 10 MIN

## 2025-09-03 PROCEDURE — 36556 INSERT NON-TUNNEL CV CATH: CPT

## 2025-09-03 PROCEDURE — 96375 TX/PRO/DX INJ NEW DRUG ADDON: CPT

## 2025-09-03 PROCEDURE — 250N000011 HC RX IP 250 OP 636: Performed by: EMERGENCY MEDICINE

## 2025-09-03 PROCEDURE — 87040 BLOOD CULTURE FOR BACTERIA: CPT | Performed by: EMERGENCY MEDICINE

## 2025-09-03 PROCEDURE — 84443 ASSAY THYROID STIM HORMONE: CPT | Performed by: EMERGENCY MEDICINE

## 2025-09-03 PROCEDURE — 96360 HYDRATION IV INFUSION INIT: CPT | Mod: 59

## 2025-09-03 PROCEDURE — 83605 ASSAY OF LACTIC ACID: CPT | Performed by: EMERGENCY MEDICINE

## 2025-09-03 PROCEDURE — 272N000007 HC KIT ART LINE INSERTION

## 2025-09-03 PROCEDURE — 255N000002 HC RX 255 OP 636: Performed by: EMERGENCY MEDICINE

## 2025-09-03 PROCEDURE — 80053 COMPREHEN METABOLIC PANEL: CPT | Performed by: EMERGENCY MEDICINE

## 2025-09-03 PROCEDURE — 83690 ASSAY OF LIPASE: CPT | Performed by: EMERGENCY MEDICINE

## 2025-09-03 PROCEDURE — 93005 ELECTROCARDIOGRAM TRACING: CPT

## 2025-09-03 PROCEDURE — 86901 BLOOD TYPING SEROLOGIC RH(D): CPT | Performed by: EMERGENCY MEDICINE

## 2025-09-03 PROCEDURE — 83880 ASSAY OF NATRIURETIC PEPTIDE: CPT | Performed by: EMERGENCY MEDICINE

## 2025-09-03 PROCEDURE — 96367 TX/PROPH/DG ADDL SEQ IV INF: CPT

## 2025-09-03 PROCEDURE — 250N000009 HC RX 250: Performed by: EMERGENCY MEDICINE

## 2025-09-03 PROCEDURE — 94002 VENT MGMT INPAT INIT DAY: CPT

## 2025-09-03 PROCEDURE — 96376 TX/PRO/DX INJ SAME DRUG ADON: CPT

## 2025-09-03 PROCEDURE — 83605 ASSAY OF LACTIC ACID: CPT

## 2025-09-03 PROCEDURE — 85025 COMPLETE CBC W/AUTO DIFF WBC: CPT | Performed by: EMERGENCY MEDICINE

## 2025-09-03 PROCEDURE — 82805 BLOOD GASES W/O2 SATURATION: CPT | Performed by: EMERGENCY MEDICINE

## 2025-09-03 PROCEDURE — 36415 COLL VENOUS BLD VENIPUNCTURE: CPT | Performed by: EMERGENCY MEDICINE

## 2025-09-03 PROCEDURE — 82550 ASSAY OF CK (CPK): CPT | Performed by: EMERGENCY MEDICINE

## 2025-09-03 PROCEDURE — 258N000003 HC RX IP 258 OP 636: Performed by: EMERGENCY MEDICINE

## 2025-09-03 PROCEDURE — 200N000001 HC R&B ICU

## 2025-09-03 PROCEDURE — 96366 THER/PROPH/DIAG IV INF ADDON: CPT

## 2025-09-03 PROCEDURE — 85610 PROTHROMBIN TIME: CPT | Performed by: EMERGENCY MEDICINE

## 2025-09-03 PROCEDURE — 87637 SARSCOV2&INF A&B&RSV AMP PRB: CPT | Performed by: EMERGENCY MEDICINE

## 2025-09-03 PROCEDURE — 82962 GLUCOSE BLOOD TEST: CPT

## 2025-09-03 PROCEDURE — 81003 URINALYSIS AUTO W/O SCOPE: CPT | Performed by: EMERGENCY MEDICINE

## 2025-09-03 RX ORDER — LORAZEPAM 0.5 MG/1
0.5 TABLET ORAL 2 TIMES DAILY PRN
Status: ON HOLD | COMMUNITY

## 2025-09-03 RX ORDER — HALOPERIDOL 5 MG/ML
5 INJECTION INTRAMUSCULAR ONCE
Status: DISCONTINUED | OUTPATIENT
Start: 2025-09-03 | End: 2025-09-03

## 2025-09-03 RX ORDER — MIDAZOLAM HCL IN 0.9 % NACL/PF 1 MG/ML
1-8 PLASTIC BAG, INJECTION (ML) INTRAVENOUS CONTINUOUS
Status: DISPENSED | OUTPATIENT
Start: 2025-09-03

## 2025-09-03 RX ORDER — MIDAZOLAM HCL IN 0.9 % NACL/PF 1 MG/ML
PLASTIC BAG, INJECTION (ML) INTRAVENOUS
Status: DISCONTINUED
Start: 2025-09-03 | End: 2025-09-03 | Stop reason: HOSPADM

## 2025-09-03 RX ORDER — HYDROMORPHONE HYDROCHLORIDE 1 MG/ML
0.5 INJECTION, SOLUTION INTRAMUSCULAR; INTRAVENOUS; SUBCUTANEOUS ONCE
Refills: 0 | Status: COMPLETED | OUTPATIENT
Start: 2025-09-03 | End: 2025-09-03

## 2025-09-03 RX ORDER — ACETAMINOPHEN 325 MG/1
325-650 TABLET ORAL EVERY 4 HOURS PRN
Status: ON HOLD | COMMUNITY

## 2025-09-03 RX ORDER — ROPIVACAINE IN 0.9% SOD CHL/PF 0.1 %
.01-.2 PLASTIC BAG, INJECTION (ML) EPIDURAL CONTINUOUS
Status: DISCONTINUED | OUTPATIENT
Start: 2025-09-03 | End: 2025-09-04

## 2025-09-03 RX ORDER — SENNOSIDES 8.6 MG
1 TABLET ORAL 2 TIMES DAILY PRN
Status: ON HOLD | COMMUNITY

## 2025-09-03 RX ORDER — ONDANSETRON 2 MG/ML
4 INJECTION INTRAMUSCULAR; INTRAVENOUS ONCE
Status: COMPLETED | OUTPATIENT
Start: 2025-09-03 | End: 2025-09-03

## 2025-09-03 RX ORDER — DIAZEPAM 10 MG/2ML
5 INJECTION, SOLUTION INTRAMUSCULAR; INTRAVENOUS ONCE
Status: DISCONTINUED | OUTPATIENT
Start: 2025-09-03 | End: 2025-09-04

## 2025-09-03 RX ORDER — VELPATASVIR AND SOFOSBUVIR 100; 400 MG/1; MG/1
1 TABLET, FILM COATED ORAL DAILY
Status: ON HOLD | COMMUNITY
End: 2025-09-16

## 2025-09-03 RX ORDER — AMLODIPINE BESYLATE 10 MG/1
10 TABLET ORAL DAILY
Status: ON HOLD | COMMUNITY

## 2025-09-03 RX ORDER — BENZTROPINE MESYLATE 0.5 MG/1
0.5 TABLET ORAL
Status: ON HOLD | COMMUNITY

## 2025-09-03 RX ORDER — ONDANSETRON 2 MG/ML
INJECTION INTRAMUSCULAR; INTRAVENOUS
Status: COMPLETED
Start: 2025-09-03 | End: 2025-09-03

## 2025-09-03 RX ORDER — PIPERACILLIN SODIUM, TAZOBACTAM SODIUM 4; .5 G/20ML; G/20ML
4.5 INJECTION, POWDER, LYOPHILIZED, FOR SOLUTION INTRAVENOUS ONCE
Status: COMPLETED | OUTPATIENT
Start: 2025-09-03 | End: 2025-09-03

## 2025-09-03 RX ORDER — DIAZEPAM 10 MG/2ML
5 INJECTION, SOLUTION INTRAMUSCULAR; INTRAVENOUS ONCE
Status: COMPLETED | OUTPATIENT
Start: 2025-09-03 | End: 2025-09-03

## 2025-09-03 RX ADMIN — SODIUM CHLORIDE 1000 ML: 0.9 INJECTION, SOLUTION INTRAVENOUS at 17:45

## 2025-09-03 RX ADMIN — ONDANSETRON 4 MG: 2 INJECTION, SOLUTION INTRAMUSCULAR; INTRAVENOUS at 17:12

## 2025-09-03 RX ADMIN — ONDANSETRON 4 MG: 2 INJECTION INTRAMUSCULAR; INTRAVENOUS at 17:12

## 2025-09-03 RX ADMIN — HYDROMORPHONE HYDROCHLORIDE 1 MG: 1 INJECTION, SOLUTION INTRAMUSCULAR; INTRAVENOUS; SUBCUTANEOUS at 18:21

## 2025-09-03 RX ADMIN — NOREPINEPHRINE BITARTRATE 0.2 MCG/KG/MIN: 0.02 INJECTION, SOLUTION INTRAVENOUS at 23:15

## 2025-09-03 RX ADMIN — SODIUM CHLORIDE 500 ML: 0.9 INJECTION, SOLUTION INTRAVENOUS at 18:40

## 2025-09-03 RX ADMIN — MIDAZOLAM HYDROCHLORIDE 4 MG/HR: 1 INJECTION, SOLUTION INTRAVENOUS at 22:45

## 2025-09-03 RX ADMIN — ONDANSETRON 4 MG: 2 INJECTION, SOLUTION INTRAMUSCULAR; INTRAVENOUS at 18:19

## 2025-09-03 RX ADMIN — ONDANSETRON 4 MG: 2 INJECTION, SOLUTION INTRAMUSCULAR; INTRAVENOUS at 19:19

## 2025-09-03 RX ADMIN — PIPERACILLIN AND TAZOBACTAM 4.5 G: 4; .5 INJECTION, POWDER, FOR SOLUTION INTRAVENOUS at 17:25

## 2025-09-03 RX ADMIN — HYDROMORPHONE HYDROCHLORIDE 0.5 MG: 1 INJECTION, SOLUTION INTRAMUSCULAR; INTRAVENOUS; SUBCUTANEOUS at 17:46

## 2025-09-03 RX ADMIN — DIAZEPAM 5 MG: 5 INJECTION INTRAMUSCULAR; INTRAVENOUS at 20:40

## 2025-09-03 RX ADMIN — PANTOPRAZOLE SODIUM 80 MG: 40 INJECTION, POWDER, FOR SOLUTION INTRAVENOUS at 17:15

## 2025-09-03 RX ADMIN — SODIUM CHLORIDE 1000 ML: 0.9 INJECTION, SOLUTION INTRAVENOUS at 17:10

## 2025-09-03 RX ADMIN — VANCOMYCIN HYDROCHLORIDE 2000 MG: 10 INJECTION, POWDER, LYOPHILIZED, FOR SOLUTION INTRAVENOUS at 18:24

## 2025-09-03 RX ADMIN — SODIUM CHLORIDE 1000 ML: 9 INJECTION, SOLUTION INTRAVENOUS at 19:35

## 2025-09-03 RX ADMIN — NOREPINEPHRINE BITARTRATE 0.03 MCG/KG/MIN: 0.02 INJECTION, SOLUTION INTRAVENOUS at 18:48

## 2025-09-03 RX ADMIN — IOHEXOL 100 ML: 350 INJECTION, SOLUTION INTRAVENOUS at 22:39

## 2025-09-03 ASSESSMENT — ACTIVITIES OF DAILY LIVING (ADL)
ADLS_ACUITY_SCORE: 55

## 2025-09-04 LAB
ALBUMIN SERPL BCG-MCNC: 2.4 G/DL (ref 3.5–5.2)
ALBUMIN SERPL BCG-MCNC: 2.5 G/DL (ref 3.5–5.2)
ALBUMIN SERPL BCG-MCNC: 2.7 G/DL (ref 3.5–5.2)
ALBUMIN SERPL BCG-MCNC: 2.8 G/DL (ref 3.5–5.2)
ALBUMIN SERPL BCG-MCNC: 3.1 G/DL (ref 3.5–5.2)
ALLEN'S TEST: ABNORMAL
ALLEN'S TEST: ABNORMAL
ALLEN'S TEST: YES
ALP SERPL-CCNC: 42 U/L (ref 40–150)
ALP SERPL-CCNC: 43 U/L (ref 40–150)
ALP SERPL-CCNC: 59 U/L (ref 40–150)
ALP SERPL-CCNC: 67 U/L (ref 40–150)
ALT SERPL W P-5'-P-CCNC: 107 U/L (ref 0–70)
ALT SERPL W P-5'-P-CCNC: 86 U/L (ref 0–70)
ALT SERPL W P-5'-P-CCNC: 94 U/L (ref 0–70)
ALT SERPL W P-5'-P-CCNC: 99 U/L (ref 0–70)
ANION GAP SERPL CALCULATED.3IONS-SCNC: 10 MMOL/L (ref 7–15)
ANION GAP SERPL CALCULATED.3IONS-SCNC: 11 MMOL/L (ref 7–15)
ANION GAP SERPL CALCULATED.3IONS-SCNC: 13 MMOL/L (ref 7–15)
ANION GAP SERPL CALCULATED.3IONS-SCNC: 13 MMOL/L (ref 7–15)
ANION GAP SERPL CALCULATED.3IONS-SCNC: 23 MMOL/L (ref 7–15)
AST SERPL W P-5'-P-CCNC: 57 U/L (ref 0–45)
AST SERPL W P-5'-P-CCNC: 62 U/L (ref 0–45)
AST SERPL W P-5'-P-CCNC: 62 U/L (ref 0–45)
AST SERPL W P-5'-P-CCNC: 64 U/L (ref 0–45)
ATRIAL RATE - MUSE: 69 BPM
BACTERIA SPEC CULT: NORMAL
BACTERIA SPEC CULT: NORMAL
BASE EXCESS BLDA CALC-SCNC: -0.6 MMOL/L (ref -3–3)
BASE EXCESS BLDA CALC-SCNC: -10.7 MMOL/L (ref -3–3)
BASE EXCESS BLDA CALC-SCNC: -6 MMOL/L (ref -3–3)
BASE EXCESS BLDV CALC-SCNC: -11.5 MMOL/L (ref -3–3)
BASE EXCESS BLDV CALC-SCNC: 0.1 MMOL/L (ref -3–3)
BASOPHILS # BLD AUTO: <0.03 10E3/UL (ref 0–0.2)
BASOPHILS NFR BLD AUTO: 0.4 %
BILIRUB SERPL-MCNC: 0.3 MG/DL
BILIRUB SERPL-MCNC: 0.4 MG/DL
BILIRUB SERPL-MCNC: 0.5 MG/DL
BILIRUB SERPL-MCNC: 0.6 MG/DL
BUN SERPL-MCNC: 27.7 MG/DL (ref 8–23)
BUN SERPL-MCNC: 32.9 MG/DL (ref 8–23)
BUN SERPL-MCNC: 33.1 MG/DL (ref 8–23)
BUN SERPL-MCNC: 33.8 MG/DL (ref 8–23)
BUN SERPL-MCNC: 33.9 MG/DL (ref 8–23)
BURR CELLS BLD QL SMEAR: SLIGHT
CALCIUM SERPL-MCNC: 8.6 MG/DL (ref 8.8–10.4)
CALCIUM SERPL-MCNC: 9 MG/DL (ref 8.8–10.4)
CALCIUM SERPL-MCNC: 9.2 MG/DL (ref 8.8–10.4)
CALCIUM SERPL-MCNC: 9.7 MG/DL (ref 8.8–10.4)
CALCIUM SERPL-MCNC: 9.8 MG/DL (ref 8.8–10.4)
CHLORIDE SERPL-SCNC: 113 MMOL/L (ref 98–107)
CHLORIDE SERPL-SCNC: 115 MMOL/L (ref 98–107)
CHLORIDE SERPL-SCNC: 117 MMOL/L (ref 98–107)
CHLORIDE SERPL-SCNC: 117 MMOL/L (ref 98–107)
CHLORIDE SERPL-SCNC: 118 MMOL/L (ref 98–107)
CREAT SERPL-MCNC: 1.46 MG/DL (ref 0.67–1.17)
CREAT SERPL-MCNC: 1.69 MG/DL (ref 0.67–1.17)
CREAT SERPL-MCNC: 1.79 MG/DL (ref 0.67–1.17)
CREAT SERPL-MCNC: 1.81 MG/DL (ref 0.67–1.17)
CREAT SERPL-MCNC: 1.84 MG/DL (ref 0.67–1.17)
CREAT UR-MCNC: 82.1 MG/DL
CRP SERPL-MCNC: 16.08 MG/L
DIASTOLIC BLOOD PRESSURE - MUSE: NORMAL MMHG
EGFRCR SERPLBLD CKD-EPI 2021: 41 ML/MIN/1.73M2
EGFRCR SERPLBLD CKD-EPI 2021: 42 ML/MIN/1.73M2
EGFRCR SERPLBLD CKD-EPI 2021: 43 ML/MIN/1.73M2
EGFRCR SERPLBLD CKD-EPI 2021: 46 ML/MIN/1.73M2
EGFRCR SERPLBLD CKD-EPI 2021: 54 ML/MIN/1.73M2
EOSINOPHIL # BLD AUTO: <0.03 10E3/UL (ref 0–0.7)
EOSINOPHIL NFR BLD AUTO: 0 %
ERYTHROCYTE [DISTWIDTH] IN BLOOD BY AUTOMATED COUNT: 13.5 % (ref 10–15)
ERYTHROCYTE [DISTWIDTH] IN BLOOD BY AUTOMATED COUNT: 13.5 % (ref 10–15)
ERYTHROCYTE [DISTWIDTH] IN BLOOD BY AUTOMATED COUNT: 13.7 % (ref 10–15)
EST. AVERAGE GLUCOSE BLD GHB EST-MCNC: 123 MG/DL
FRACT EXCRET NA UR+SERPL-RTO: 1 %
GLUCOSE BLDC GLUCOMTR-MCNC: 107 MG/DL (ref 70–99)
GLUCOSE BLDC GLUCOMTR-MCNC: 116 MG/DL (ref 70–99)
GLUCOSE BLDC GLUCOMTR-MCNC: 210 MG/DL (ref 70–99)
GLUCOSE BLDC GLUCOMTR-MCNC: 249 MG/DL (ref 70–99)
GLUCOSE BLDC GLUCOMTR-MCNC: 249 MG/DL (ref 70–99)
GLUCOSE BLDC GLUCOMTR-MCNC: 271 MG/DL (ref 70–99)
GLUCOSE BLDC GLUCOMTR-MCNC: 276 MG/DL (ref 70–99)
GLUCOSE BLDC GLUCOMTR-MCNC: 277 MG/DL (ref 70–99)
GLUCOSE BLDC GLUCOMTR-MCNC: 282 MG/DL (ref 70–99)
GLUCOSE SERPL-MCNC: 110 MG/DL (ref 70–99)
GLUCOSE SERPL-MCNC: 121 MG/DL (ref 70–99)
GLUCOSE SERPL-MCNC: 122 MG/DL (ref 70–99)
GLUCOSE SERPL-MCNC: 260 MG/DL (ref 70–99)
GLUCOSE SERPL-MCNC: 65 MG/DL (ref 70–99)
HBA1C MFR BLD: 5.9 %
HCO3 BLD-SCNC: 16 MMOL/L (ref 21–28)
HCO3 BLD-SCNC: 20 MMOL/L (ref 21–28)
HCO3 BLD-SCNC: 24 MMOL/L (ref 21–28)
HCO3 BLDV-SCNC: 17 MMOL/L (ref 21–28)
HCO3 BLDV-SCNC: 28 MMOL/L (ref 21–28)
HCO3 SERPL-SCNC: 14 MMOL/L (ref 22–29)
HCO3 SERPL-SCNC: 19 MMOL/L (ref 22–29)
HCO3 SERPL-SCNC: 21 MMOL/L (ref 22–29)
HCO3 SERPL-SCNC: 21 MMOL/L (ref 22–29)
HCO3 SERPL-SCNC: 22 MMOL/L (ref 22–29)
HCT VFR BLD AUTO: 37.5 % (ref 40–53)
HCT VFR BLD AUTO: 39.6 % (ref 40–53)
HCT VFR BLD AUTO: 42.8 % (ref 40–53)
HGB BLD-MCNC: 12.6 G/DL (ref 13.3–17.7)
HGB BLD-MCNC: 13.3 G/DL (ref 13.3–17.7)
HGB BLD-MCNC: 14.4 G/DL (ref 13.3–17.7)
IMM GRANULOCYTES # BLD: <0.03 10E3/UL
IMM GRANULOCYTES NFR BLD: 0.2 %
INTERPRETATION ECG - MUSE: NORMAL
LACTATE SERPL-SCNC: 11.1 MMOL/L (ref 0.7–2)
LACTATE SERPL-SCNC: 11.1 MMOL/L (ref 0.7–2)
LACTATE SERPL-SCNC: 11.7 MMOL/L (ref 0.7–2)
LACTATE SERPL-SCNC: 2.4 MMOL/L (ref 0.7–2)
LACTATE SERPL-SCNC: 4.1 MMOL/L (ref 0.7–2)
LACTATE SERPL-SCNC: 5.4 MMOL/L (ref 0.7–2)
LACTATE SERPL-SCNC: 7.9 MMOL/L (ref 0.7–2)
LACTATE SERPL-SCNC: 8 MMOL/L (ref 0.7–2)
LACTATE SERPL-SCNC: 9.5 MMOL/L (ref 0.7–2)
LDH SERPL L TO P-CCNC: 204 U/L (ref 0–250)
LVEF ECHO: NORMAL
LYMPHOCYTES # BLD AUTO: 0.17 10E3/UL (ref 0.8–5.3)
LYMPHOCYTES NFR BLD AUTO: 3.4 %
MAGNESIUM SERPL-MCNC: 1.3 MG/DL (ref 1.7–2.3)
MAGNESIUM SERPL-MCNC: 1.4 MG/DL (ref 1.7–2.3)
MAGNESIUM SERPL-MCNC: 2.5 MG/DL (ref 1.7–2.3)
MCH RBC QN AUTO: 28.6 PG (ref 26.5–33)
MCH RBC QN AUTO: 28.7 PG (ref 26.5–33)
MCH RBC QN AUTO: 29 PG (ref 26.5–33)
MCHC RBC AUTO-ENTMCNC: 33.6 G/DL (ref 31.5–36.5)
MCV RBC AUTO: 85.1 FL (ref 78–100)
MCV RBC AUTO: 85.4 FL (ref 78–100)
MCV RBC AUTO: 86.3 FL (ref 78–100)
MONOCYTES # BLD AUTO: 0.52 10E3/UL (ref 0–1.3)
MONOCYTES NFR BLD AUTO: 10.4 %
MRSA DNA SPEC QL NAA+PROBE: NEGATIVE
NEUTROPHILS # BLD AUTO: 4.3 10E3/UL (ref 1.6–8.3)
NEUTROPHILS NFR BLD AUTO: 85.6 %
NRBC # BLD AUTO: 0.06 10E3/UL
NRBC BLD AUTO-RTO: 1.2 /100
O2/TOTAL GAS SETTING VFR VENT: 100 %
O2/TOTAL GAS SETTING VFR VENT: 70 %
OSMOLALITY SERPL: 329 MMOL/KG (ref 280–301)
OXYHGB MFR BLDA: 88 % (ref 92–100)
OXYHGB MFR BLDA: 95 % (ref 92–100)
OXYHGB MFR BLDA: 97 % (ref 92–100)
OXYHGB MFR BLDV: 52 % (ref 70–75)
OXYHGB MFR BLDV: 74 % (ref 70–75)
P AXIS - MUSE: 55 DEGREES
PCO2 BLD: 39 MM HG (ref 35–45)
PCO2 BLD: 39 MM HG (ref 35–45)
PCO2 BLD: 42 MM HG (ref 35–45)
PCO2 BLDV: 49 MM HG (ref 40–50)
PCO2 BLDV: 54 MM HG (ref 40–50)
PEEP: 12 CM H2O
PH BLD: 7.23 [PH] (ref 7.35–7.45)
PH BLD: 7.29 [PH] (ref 7.35–7.45)
PH BLD: 7.4 [PH] (ref 7.35–7.45)
PH BLDV: 7.15 [PH] (ref 7.32–7.43)
PH BLDV: 7.31 [PH] (ref 7.32–7.43)
PHOSPHATE SERPL-MCNC: 1.1 MG/DL (ref 2.5–4.5)
PHOSPHATE SERPL-MCNC: 1.2 MG/DL (ref 2.5–4.5)
PHOSPHATE SERPL-MCNC: 1.2 MG/DL (ref 2.5–4.5)
PHOSPHATE SERPL-MCNC: 5 MG/DL (ref 2.5–4.5)
PLAT MORPH BLD: ABNORMAL
PLATELET # BLD AUTO: 111 10E3/UL (ref 150–450)
PLATELET # BLD AUTO: 88 10E3/UL (ref 150–450)
PLATELET # BLD AUTO: 94 10E3/UL (ref 150–450)
PO2 BLD: 132 MM HG (ref 80–105)
PO2 BLD: 59 MM HG (ref 80–105)
PO2 BLD: 77 MM HG (ref 80–105)
PO2 BLDV: 30 MM HG (ref 25–47)
PO2 BLDV: 48 MM HG (ref 25–47)
POTASSIUM SERPL-SCNC: 3.3 MMOL/L (ref 3.4–5.3)
POTASSIUM SERPL-SCNC: 3.4 MMOL/L (ref 3.4–5.3)
POTASSIUM SERPL-SCNC: 3.6 MMOL/L (ref 3.4–5.3)
POTASSIUM SERPL-SCNC: 3.7 MMOL/L (ref 3.4–5.3)
POTASSIUM SERPL-SCNC: 4.9 MMOL/L (ref 3.4–5.3)
PR INTERVAL - MUSE: 160 MS
PROT SERPL-MCNC: 4.5 G/DL (ref 6.4–8.3)
PROT SERPL-MCNC: 4.8 G/DL (ref 6.4–8.3)
PROT SERPL-MCNC: 5.5 G/DL (ref 6.4–8.3)
PROT SERPL-MCNC: 6.1 G/DL (ref 6.4–8.3)
QRS DURATION - MUSE: 76 MS
QT - MUSE: 416 MS
QTC - MUSE: 445 MS
R AXIS - MUSE: 67 DEGREES
RBC # BLD AUTO: 4.39 10E6/UL (ref 4.4–5.9)
RBC # BLD AUTO: 4.59 10E6/UL (ref 4.4–5.9)
RBC # BLD AUTO: 5.03 10E6/UL (ref 4.4–5.9)
RBC MORPH BLD: ABNORMAL
SA TARGET DNA: NEGATIVE
SAO2 % BLDA: 89.8 % (ref 96–97)
SAO2 % BLDA: 96.4 % (ref 96–97)
SAO2 % BLDA: 98.8 % (ref 96–97)
SAO2 % BLDV: 53.2 % (ref 70–75)
SAO2 % BLDV: 75.4 % (ref 70–75)
SODIUM SERPL-SCNC: 144 MMOL/L (ref 135–145)
SODIUM SERPL-SCNC: 150 MMOL/L (ref 135–145)
SODIUM SERPL-SCNC: 150 MMOL/L (ref 135–145)
SODIUM SERPL-SCNC: 151 MMOL/L (ref 135–145)
SODIUM SERPL-SCNC: 152 MMOL/L (ref 135–145)
SODIUM UR-SCNC: 69 MMOL/L
SYSTOLIC BLOOD PRESSURE - MUSE: NORMAL MMHG
T AXIS - MUSE: 80 DEGREES
VENTRICULAR RATE- MUSE: 69 BPM
WBC # BLD AUTO: 2.51 10E3/UL (ref 4–11)
WBC # BLD AUTO: 5.02 10E3/UL (ref 4–11)
WBC # BLD AUTO: 5.47 10E3/UL (ref 4–11)

## 2025-09-04 PROCEDURE — 84100 ASSAY OF PHOSPHORUS: CPT | Performed by: STUDENT IN AN ORGANIZED HEALTH CARE EDUCATION/TRAINING PROGRAM

## 2025-09-04 PROCEDURE — 250N000013 HC RX MED GY IP 250 OP 250 PS 637

## 2025-09-04 PROCEDURE — 83930 ASSAY OF BLOOD OSMOLALITY: CPT | Performed by: STUDENT IN AN ORGANIZED HEALTH CARE EDUCATION/TRAINING PROGRAM

## 2025-09-04 PROCEDURE — 999N000157 HC STATISTIC RCP TIME EA 10 MIN

## 2025-09-04 PROCEDURE — 250N000009 HC RX 250: Performed by: STUDENT IN AN ORGANIZED HEALTH CARE EDUCATION/TRAINING PROGRAM

## 2025-09-04 PROCEDURE — 258N000003 HC RX IP 258 OP 636: Performed by: STUDENT IN AN ORGANIZED HEALTH CARE EDUCATION/TRAINING PROGRAM

## 2025-09-04 PROCEDURE — 83735 ASSAY OF MAGNESIUM: CPT | Performed by: STUDENT IN AN ORGANIZED HEALTH CARE EDUCATION/TRAINING PROGRAM

## 2025-09-04 PROCEDURE — 255N000002 HC RX 255 OP 636: Performed by: STUDENT IN AN ORGANIZED HEALTH CARE EDUCATION/TRAINING PROGRAM

## 2025-09-04 PROCEDURE — 82565 ASSAY OF CREATININE: CPT | Performed by: STUDENT IN AN ORGANIZED HEALTH CARE EDUCATION/TRAINING PROGRAM

## 2025-09-04 PROCEDURE — 94003 VENT MGMT INPAT SUBQ DAY: CPT

## 2025-09-04 PROCEDURE — 250N000009 HC RX 250: Performed by: SURGERY

## 2025-09-04 PROCEDURE — 250N000011 HC RX IP 250 OP 636: Performed by: STUDENT IN AN ORGANIZED HEALTH CARE EDUCATION/TRAINING PROGRAM

## 2025-09-04 PROCEDURE — 250N000011 HC RX IP 250 OP 636: Performed by: EMERGENCY MEDICINE

## 2025-09-04 PROCEDURE — 83605 ASSAY OF LACTIC ACID: CPT

## 2025-09-04 PROCEDURE — 82805 BLOOD GASES W/O2 SATURATION: CPT | Performed by: STUDENT IN AN ORGANIZED HEALTH CARE EDUCATION/TRAINING PROGRAM

## 2025-09-04 PROCEDURE — 87641 MR-STAPH DNA AMP PROBE: CPT | Performed by: STUDENT IN AN ORGANIZED HEALTH CARE EDUCATION/TRAINING PROGRAM

## 2025-09-04 PROCEDURE — 250N000011 HC RX IP 250 OP 636

## 2025-09-04 PROCEDURE — 83605 ASSAY OF LACTIC ACID: CPT | Performed by: STUDENT IN AN ORGANIZED HEALTH CARE EDUCATION/TRAINING PROGRAM

## 2025-09-04 PROCEDURE — 258N000001 HC RX 258: Performed by: SURGERY

## 2025-09-04 PROCEDURE — 250N000011 HC RX IP 250 OP 636: Performed by: SURGERY

## 2025-09-04 PROCEDURE — 258N000003 HC RX IP 258 OP 636

## 2025-09-04 PROCEDURE — 86140 C-REACTIVE PROTEIN: CPT | Performed by: STUDENT IN AN ORGANIZED HEALTH CARE EDUCATION/TRAINING PROGRAM

## 2025-09-04 PROCEDURE — 258N000003 HC RX IP 258 OP 636: Performed by: SURGERY

## 2025-09-04 PROCEDURE — 85027 COMPLETE CBC AUTOMATED: CPT

## 2025-09-04 PROCEDURE — 85041 AUTOMATED RBC COUNT: CPT | Performed by: STUDENT IN AN ORGANIZED HEALTH CARE EDUCATION/TRAINING PROGRAM

## 2025-09-04 PROCEDURE — 83615 LACTATE (LD) (LDH) ENZYME: CPT | Performed by: STUDENT IN AN ORGANIZED HEALTH CARE EDUCATION/TRAINING PROGRAM

## 2025-09-04 PROCEDURE — 200N000001 HC R&B ICU

## 2025-09-04 PROCEDURE — 250N000009 HC RX 250

## 2025-09-04 PROCEDURE — 84450 TRANSFERASE (AST) (SGOT): CPT

## 2025-09-04 PROCEDURE — 82805 BLOOD GASES W/O2 SATURATION: CPT | Performed by: SURGERY

## 2025-09-04 PROCEDURE — 82040 ASSAY OF SERUM ALBUMIN: CPT | Performed by: STUDENT IN AN ORGANIZED HEALTH CARE EDUCATION/TRAINING PROGRAM

## 2025-09-04 PROCEDURE — 83036 HEMOGLOBIN GLYCOSYLATED A1C: CPT | Performed by: STUDENT IN AN ORGANIZED HEALTH CARE EDUCATION/TRAINING PROGRAM

## 2025-09-04 PROCEDURE — 82570 ASSAY OF URINE CREATININE: CPT | Performed by: STUDENT IN AN ORGANIZED HEALTH CARE EDUCATION/TRAINING PROGRAM

## 2025-09-04 RX ORDER — DEXTROSE MONOHYDRATE 100 MG/ML
INJECTION, SOLUTION INTRAVENOUS CONTINUOUS PRN
Status: ACTIVE | OUTPATIENT
Start: 2025-09-04

## 2025-09-04 RX ORDER — CHLORHEXIDINE GLUCONATE ORAL RINSE 1.2 MG/ML
15 SOLUTION DENTAL EVERY 12 HOURS
Status: DISPENSED | OUTPATIENT
Start: 2025-09-04

## 2025-09-04 RX ORDER — METHYLPREDNISOLONE SODIUM SUCCINATE 125 MG/2ML
50 INJECTION INTRAMUSCULAR; INTRAVENOUS EVERY 8 HOURS
Status: DISCONTINUED | OUTPATIENT
Start: 2025-09-04 | End: 2025-09-04

## 2025-09-04 RX ORDER — NALOXONE HYDROCHLORIDE 0.4 MG/ML
0.2 INJECTION, SOLUTION INTRAMUSCULAR; INTRAVENOUS; SUBCUTANEOUS
Status: ACTIVE | OUTPATIENT
Start: 2025-09-04

## 2025-09-04 RX ORDER — VELPATASVIR AND SOFOSBUVIR 100; 400 MG/1; MG/1
1 TABLET, FILM COATED ORAL DAILY
Status: ACTIVE | OUTPATIENT
Start: 2025-09-04

## 2025-09-04 RX ORDER — DEXTROSE MONOHYDRATE 25 G/50ML
25-50 INJECTION, SOLUTION INTRAVENOUS
Status: DISCONTINUED | OUTPATIENT
Start: 2025-09-04 | End: 2025-09-04

## 2025-09-04 RX ORDER — ACETAMINOPHEN 500 MG
1000 TABLET ORAL EVERY 8 HOURS
Status: DISCONTINUED | OUTPATIENT
Start: 2025-09-04 | End: 2025-09-04

## 2025-09-04 RX ORDER — EPINEPHRINE IN 0.9 % SOD CHLOR 5 MG/250ML
.01-.3 PLASTIC BAG, INJECTION (ML) INTRAVENOUS CONTINUOUS
Status: DISPENSED | OUTPATIENT
Start: 2025-09-04

## 2025-09-04 RX ORDER — HEPARIN SODIUM 5000 [USP'U]/.5ML
5000 INJECTION, SOLUTION INTRAVENOUS; SUBCUTANEOUS EVERY 8 HOURS
Status: DISPENSED | OUTPATIENT
Start: 2025-09-04

## 2025-09-04 RX ORDER — INDOMETHACIN 25 MG/1
100 CAPSULE ORAL ONCE
Status: COMPLETED | OUTPATIENT
Start: 2025-09-04 | End: 2025-09-04

## 2025-09-04 RX ORDER — FENTANYL CITRATE 50 UG/ML
25-50 INJECTION, SOLUTION INTRAMUSCULAR; INTRAVENOUS
Status: DISCONTINUED | OUTPATIENT
Start: 2025-09-04 | End: 2025-09-04

## 2025-09-04 RX ORDER — BENZTROPINE MESYLATE 0.5 MG/1
0.5 TABLET ORAL
Status: ACTIVE | OUTPATIENT
Start: 2025-09-04

## 2025-09-04 RX ORDER — NICOTINE POLACRILEX 4 MG
15-30 LOZENGE BUCCAL
Status: DISCONTINUED | OUTPATIENT
Start: 2025-09-04 | End: 2025-09-04

## 2025-09-04 RX ORDER — CALCIUM CHLORIDE 100 MG/ML
INJECTION INTRAVENOUS; INTRAVENTRICULAR
Status: COMPLETED
Start: 2025-09-04 | End: 2025-09-04

## 2025-09-04 RX ORDER — CALCIUM GLUCONATE 98 MG/ML
1 INJECTION, SOLUTION INTRAVENOUS ONCE
Status: DISCONTINUED | OUTPATIENT
Start: 2025-09-04 | End: 2025-09-04

## 2025-09-04 RX ORDER — HALOPERIDOL 2 MG/1
2 TABLET ORAL 2 TIMES DAILY PRN
Status: ACTIVE | OUTPATIENT
Start: 2025-09-04

## 2025-09-04 RX ORDER — LORAZEPAM 0.5 MG/1
0.5 TABLET ORAL 2 TIMES DAILY PRN
Status: ACTIVE | OUTPATIENT
Start: 2025-09-04

## 2025-09-04 RX ORDER — EPINEPHRINE 0.1 MG/ML
0.1 INJECTION INTRAVENOUS ONCE
Status: COMPLETED | OUTPATIENT
Start: 2025-09-04 | End: 2025-09-04

## 2025-09-04 RX ORDER — MAGNESIUM SULFATE HEPTAHYDRATE 40 MG/ML
4 INJECTION, SOLUTION INTRAVENOUS ONCE
Status: COMPLETED | OUTPATIENT
Start: 2025-09-04 | End: 2025-09-04

## 2025-09-04 RX ORDER — DEXTROSE MONOHYDRATE 25 G/50ML
25-50 INJECTION, SOLUTION INTRAVENOUS
Status: ACTIVE | OUTPATIENT
Start: 2025-09-04

## 2025-09-04 RX ORDER — NICOTINE POLACRILEX 4 MG
15-30 LOZENGE BUCCAL
Status: ACTIVE | OUTPATIENT
Start: 2025-09-04

## 2025-09-04 RX ORDER — DEXTROSE MONOHYDRATE 50 MG/ML
INJECTION, SOLUTION INTRAVENOUS CONTINUOUS
Status: DISCONTINUED | OUTPATIENT
Start: 2025-09-04 | End: 2025-09-04

## 2025-09-04 RX ORDER — NALOXONE HYDROCHLORIDE 0.4 MG/ML
0.4 INJECTION, SOLUTION INTRAMUSCULAR; INTRAVENOUS; SUBCUTANEOUS
Status: ACTIVE | OUTPATIENT
Start: 2025-09-04

## 2025-09-04 RX ORDER — DEXTROSE MONOHYDRATE 25 G/50ML
1 INJECTION, SOLUTION INTRAVENOUS ONCE
Status: DISCONTINUED | OUTPATIENT
Start: 2025-09-04 | End: 2025-09-04

## 2025-09-04 RX ORDER — HYDROCORTISONE SODIUM SUCCINATE 100 MG/2ML
50 INJECTION INTRAMUSCULAR; INTRAVENOUS EVERY 6 HOURS
Status: DISPENSED | OUTPATIENT
Start: 2025-09-04

## 2025-09-04 RX ORDER — INDOMETHACIN 25 MG/1
50 CAPSULE ORAL
Status: COMPLETED | OUTPATIENT
Start: 2025-09-04 | End: 2025-09-04

## 2025-09-04 RX ORDER — CALCIUM GLUCONATE 20 MG/ML
1 INJECTION, SOLUTION INTRAVENOUS ONCE
Status: COMPLETED | OUTPATIENT
Start: 2025-09-04 | End: 2025-09-04

## 2025-09-04 RX ORDER — MEROPENEM 1 G/1
1 INJECTION, POWDER, FOR SOLUTION INTRAVENOUS EVERY 12 HOURS
Status: DISPENSED | OUTPATIENT
Start: 2025-09-04

## 2025-09-04 RX ORDER — NOREPINEPHRINE BITARTRATE 0.02 MG/ML
.01-.6 INJECTION, SOLUTION INTRAVENOUS CONTINUOUS
Status: DISPENSED | OUTPATIENT
Start: 2025-09-04

## 2025-09-04 RX ORDER — HYDROCORTISONE SODIUM SUCCINATE 100 MG/2ML
50 INJECTION INTRAMUSCULAR; INTRAVENOUS ONCE
Status: COMPLETED | OUTPATIENT
Start: 2025-09-04 | End: 2025-09-04

## 2025-09-04 RX ORDER — CALCIUM CHLORIDE 100 MG/ML
1 INJECTION INTRAVENOUS; INTRAVENTRICULAR ONCE
Status: COMPLETED | OUTPATIENT
Start: 2025-09-04 | End: 2025-09-04

## 2025-09-04 RX ORDER — INDOMETHACIN 25 MG/1
50 CAPSULE ORAL ONCE
Status: COMPLETED | OUTPATIENT
Start: 2025-09-04 | End: 2025-09-04

## 2025-09-04 RX ORDER — AMLODIPINE BESYLATE 10 MG/1
10 TABLET ORAL DAILY
Status: ACTIVE | OUTPATIENT
Start: 2025-09-04

## 2025-09-04 RX ORDER — SODIUM CHLORIDE, SODIUM LACTATE, POTASSIUM CHLORIDE, CALCIUM CHLORIDE 600; 310; 30; 20 MG/100ML; MG/100ML; MG/100ML; MG/100ML
INJECTION, SOLUTION INTRAVENOUS CONTINUOUS
Status: ACTIVE | OUTPATIENT
Start: 2025-09-04

## 2025-09-04 RX ORDER — INDOMETHACIN 25 MG/1
CAPSULE ORAL
Status: COMPLETED
Start: 2025-09-04 | End: 2025-09-04

## 2025-09-04 RX ADMIN — HEPARIN SODIUM 5000 UNITS: 5000 INJECTION, SOLUTION INTRAVENOUS; SUBCUTANEOUS at 15:19

## 2025-09-04 RX ADMIN — NOREPINEPHRINE BITARTRATE 0.4 MCG/KG/MIN: 0.02 INJECTION, SOLUTION INTRAVENOUS at 18:26

## 2025-09-04 RX ADMIN — SODIUM CHLORIDE, SODIUM LACTATE, POTASSIUM CHLORIDE, AND CALCIUM CHLORIDE 1000 ML: .6; .31; .03; .02 INJECTION, SOLUTION INTRAVENOUS at 10:44

## 2025-09-04 RX ADMIN — SODIUM BICARBONATE 50 MEQ: 84 INJECTION INTRAVENOUS at 01:43

## 2025-09-04 RX ADMIN — SODIUM CHLORIDE, SODIUM LACTATE, POTASSIUM CHLORIDE, AND CALCIUM CHLORIDE 1000 ML: .6; .31; .03; .02 INJECTION, SOLUTION INTRAVENOUS at 09:19

## 2025-09-04 RX ADMIN — SODIUM CHLORIDE, SODIUM LACTATE, POTASSIUM CHLORIDE, AND CALCIUM CHLORIDE: .6; .31; .03; .02 INJECTION, SOLUTION INTRAVENOUS at 22:20

## 2025-09-04 RX ADMIN — NOREPINEPHRINE BITARTRATE 0.25 MCG/KG/MIN: 0.02 INJECTION, SOLUTION INTRAVENOUS at 01:15

## 2025-09-04 RX ADMIN — SODIUM CHLORIDE, SODIUM LACTATE, POTASSIUM CHLORIDE, AND CALCIUM CHLORIDE: .6; .31; .03; .02 INJECTION, SOLUTION INTRAVENOUS at 09:35

## 2025-09-04 RX ADMIN — MEROPENEM 1 G: 1 INJECTION, POWDER, FOR SOLUTION INTRAVENOUS at 22:56

## 2025-09-04 RX ADMIN — Medication 25 MCG/HR: at 09:51

## 2025-09-04 RX ADMIN — NOREPINEPHRINE BITARTRATE 0.45 MCG/KG/MIN: 0.02 INJECTION, SOLUTION INTRAVENOUS at 01:53

## 2025-09-04 RX ADMIN — CHLORHEXIDINE GLUCONATE 15 ML: 1.2 SOLUTION ORAL at 19:45

## 2025-09-04 RX ADMIN — MAGNESIUM SULFATE HEPTAHYDRATE 4 G: 40 INJECTION, SOLUTION INTRAVENOUS at 11:46

## 2025-09-04 RX ADMIN — NOREPINEPHRINE BITARTRATE 0.45 MCG/KG/MIN: 0.02 INJECTION, SOLUTION INTRAVENOUS at 16:28

## 2025-09-04 RX ADMIN — PERFLUTREN 10 ML (DILUTED): 6.52 INJECTION, SUSPENSION INTRAVENOUS at 09:41

## 2025-09-04 RX ADMIN — SODIUM BICARBONATE 50 MEQ: 84 INJECTION INTRAVENOUS at 11:44

## 2025-09-04 RX ADMIN — CALCIUM GLUCONATE 1 G: 20 INJECTION, SOLUTION INTRAVENOUS at 12:08

## 2025-09-04 RX ADMIN — VASOPRESSIN 2.4 UNITS/HR: 20 INJECTION, SOLUTION INTRAMUSCULAR; SUBCUTANEOUS at 16:02

## 2025-09-04 RX ADMIN — SODIUM BICARBONATE 50 MEQ: 84 INJECTION INTRAVENOUS at 01:47

## 2025-09-04 RX ADMIN — NOREPINEPHRINE BITARTRATE 0.4 MCG/KG/MIN: 0.02 INJECTION, SOLUTION INTRAVENOUS at 20:33

## 2025-09-04 RX ADMIN — MEROPENEM 1 G: 1 INJECTION, POWDER, FOR SOLUTION INTRAVENOUS at 11:34

## 2025-09-04 RX ADMIN — HEPARIN SODIUM 5000 UNITS: 5000 INJECTION, SOLUTION INTRAVENOUS; SUBCUTANEOUS at 23:01

## 2025-09-04 RX ADMIN — CALCIUM CHLORIDE 1 G: 100 INJECTION INTRAVENOUS; INTRAVENTRICULAR at 01:53

## 2025-09-04 RX ADMIN — PANTOPRAZOLE SODIUM 40 MG: 40 INJECTION, POWDER, FOR SOLUTION INTRAVENOUS at 09:47

## 2025-09-04 RX ADMIN — EPINEPHRINE 0.1 MG: 0.1 INJECTION INTRAVENOUS at 01:38

## 2025-09-04 RX ADMIN — EPINEPHRINE 0.01 MCG/KG/MIN: 1 INJECTION INTRAMUSCULAR; INTRAVENOUS; SUBCUTANEOUS at 08:55

## 2025-09-04 RX ADMIN — VASOPRESSIN 2.4 UNITS/HR: 20 INJECTION, SOLUTION INTRAMUSCULAR; SUBCUTANEOUS at 07:40

## 2025-09-04 RX ADMIN — NOREPINEPHRINE BITARTRATE 0.5 MCG/KG/MIN: 0.02 INJECTION, SOLUTION INTRAVENOUS at 13:02

## 2025-09-04 RX ADMIN — DEXTROSE MONOHYDRATE 50 ML: 25 INJECTION, SOLUTION INTRAVENOUS at 06:53

## 2025-09-04 RX ADMIN — NOREPINEPHRINE BITARTRATE 0.4 MCG/KG/MIN: 0.02 INJECTION, SOLUTION INTRAVENOUS at 01:20

## 2025-09-04 RX ADMIN — VANCOMYCIN HYDROCHLORIDE 1250 MG: 10 INJECTION, POWDER, LYOPHILIZED, FOR SOLUTION INTRAVENOUS at 18:15

## 2025-09-04 RX ADMIN — SODIUM BICARBONATE: 84 INJECTION, SOLUTION INTRAVENOUS at 17:17

## 2025-09-04 RX ADMIN — METHYLPREDNISOLONE SODIUM SUCCINATE 50 MG: 125 INJECTION, POWDER, FOR SOLUTION INTRAMUSCULAR; INTRAVENOUS at 09:47

## 2025-09-04 RX ADMIN — CHLORHEXIDINE GLUCONATE 15 ML: 1.2 SOLUTION ORAL at 09:47

## 2025-09-04 RX ADMIN — INSULIN HUMAN 2.5 UNITS/HR: 1 INJECTION, SOLUTION INTRAVENOUS at 16:11

## 2025-09-04 RX ADMIN — VASOPRESSIN 2.4 UNITS/HR: 20 INJECTION, SOLUTION INTRAMUSCULAR; SUBCUTANEOUS at 00:50

## 2025-09-04 RX ADMIN — SODIUM CHLORIDE, SODIUM LACTATE, POTASSIUM CHLORIDE, AND CALCIUM CHLORIDE: .6; .31; .03; .02 INJECTION, SOLUTION INTRAVENOUS at 14:17

## 2025-09-04 RX ADMIN — NOREPINEPHRINE BITARTRATE 0.5 MCG/KG/MIN: 0.02 INJECTION, SOLUTION INTRAVENOUS at 14:24

## 2025-09-04 RX ADMIN — Medication 25 MCG/HR: at 09:37

## 2025-09-04 RX ADMIN — HYDROCORTISONE SODIUM SUCCINATE 50 MG: 100 INJECTION, POWDER, FOR SOLUTION INTRAMUSCULAR; INTRAVENOUS at 12:07

## 2025-09-04 RX ADMIN — EPINEPHRINE 0.1 MG: 0.1 INJECTION INTRAVENOUS at 01:44

## 2025-09-04 RX ADMIN — INDOMETHACIN 100 MEQ: 25 CAPSULE ORAL at 01:23

## 2025-09-04 RX ADMIN — EPINEPHRINE 0.1 MG: 0.1 INJECTION INTRAVENOUS at 01:27

## 2025-09-04 RX ADMIN — CALCIUM CHLORIDE 1 G: 100 INJECTION INTRAVENOUS; INTRAVENTRICULAR at 01:46

## 2025-09-04 RX ADMIN — SODIUM BICARBONATE 100 MEQ: 84 INJECTION INTRAVENOUS at 01:23

## 2025-09-04 RX ADMIN — HYDROCORTISONE SODIUM SUCCINATE 50 MG: 100 INJECTION, POWDER, FOR SOLUTION INTRAMUSCULAR; INTRAVENOUS at 13:20

## 2025-09-04 RX ADMIN — PANTOPRAZOLE SODIUM 40 MG: 40 INJECTION, POWDER, FOR SOLUTION INTRAVENOUS at 19:45

## 2025-09-04 RX ADMIN — NOREPINEPHRINE BITARTRATE 0.25 MCG/KG/MIN: 0.02 INJECTION, SOLUTION INTRAVENOUS at 05:13

## 2025-09-04 RX ADMIN — SODIUM BICARBONATE: 84 INJECTION, SOLUTION INTRAVENOUS at 03:03

## 2025-09-04 RX ADMIN — SODIUM CHLORIDE, SODIUM LACTATE, POTASSIUM CHLORIDE, AND CALCIUM CHLORIDE: .6; .31; .03; .02 INJECTION, SOLUTION INTRAVENOUS at 03:11

## 2025-09-04 RX ADMIN — INSULIN HUMAN 7 UNITS/HR: 1 INJECTION, SOLUTION INTRAVENOUS at 23:56

## 2025-09-04 RX ADMIN — ANGIOTENSIN II 20 NG/KG/MIN: 2.5 INJECTION INTRAVENOUS at 01:56

## 2025-09-04 RX ADMIN — MIDAZOLAM HYDROCHLORIDE 5 MG/HR: 1 INJECTION, SOLUTION INTRAVENOUS at 14:04

## 2025-09-04 RX ADMIN — POTASSIUM PHOSPHATE, MONOBASIC AND POTASSIUM PHOSPHATE, DIBASIC 30 MMOL: 224; 236 INJECTION, SOLUTION, CONCENTRATE INTRAVENOUS at 11:57

## 2025-09-04 RX ADMIN — DEXTROSE: 5 SOLUTION INTRAVENOUS at 13:16

## 2025-09-04 RX ADMIN — NOREPINEPHRINE BITARTRATE 0.4 MCG/KG/MIN: 0.02 INJECTION, SOLUTION INTRAVENOUS at 22:33

## 2025-09-04 RX ADMIN — METHYLPREDNISOLONE SODIUM SUCCINATE 50 MG: 125 INJECTION, POWDER, FOR SOLUTION INTRAMUSCULAR; INTRAVENOUS at 02:38

## 2025-09-04 RX ADMIN — NOREPINEPHRINE BITARTRATE 0.2 MCG/KG/MIN: 0.02 INJECTION, SOLUTION INTRAVENOUS at 10:43

## 2025-09-04 RX ADMIN — HYDROCORTISONE SODIUM SUCCINATE 50 MG: 100 INJECTION, POWDER, FOR SOLUTION INTRAMUSCULAR; INTRAVENOUS at 18:16

## 2025-09-04 ASSESSMENT — ACTIVITIES OF DAILY LIVING (ADL)
ADLS_ACUITY_SCORE: 81
ADLS_ACUITY_SCORE: 78
ADLS_ACUITY_SCORE: 81
ADLS_ACUITY_SCORE: 77
ADLS_ACUITY_SCORE: 81
ADLS_ACUITY_SCORE: 77
ADLS_ACUITY_SCORE: 55
ADLS_ACUITY_SCORE: 81
ADLS_ACUITY_SCORE: 77
ADLS_ACUITY_SCORE: 55
ADLS_ACUITY_SCORE: 81
ADLS_ACUITY_SCORE: 55
ADLS_ACUITY_SCORE: 55
ADLS_ACUITY_SCORE: 79
ADLS_ACUITY_SCORE: 79
ADLS_ACUITY_SCORE: 81
ADLS_ACUITY_SCORE: 77
ADLS_ACUITY_SCORE: 81
ADLS_ACUITY_SCORE: 81
ADLS_ACUITY_SCORE: 77
ADLS_ACUITY_SCORE: 55

## 2025-09-05 VITALS
BODY MASS INDEX: 23.02 KG/M2 | HEART RATE: 81 BPM | DIASTOLIC BLOOD PRESSURE: 76 MMHG | SYSTOLIC BLOOD PRESSURE: 125 MMHG | WEIGHT: 173.72 LBS | OXYGEN SATURATION: 97 % | RESPIRATION RATE: 22 BRPM | TEMPERATURE: 97.7 F | HEIGHT: 73 IN

## 2025-09-05 LAB — GLUCOSE BLDC GLUCOMTR-MCNC: 171 MG/DL (ref 70–99)
